# Patient Record
Sex: FEMALE | Race: WHITE | Employment: FULL TIME | URBAN - METROPOLITAN AREA
[De-identification: names, ages, dates, MRNs, and addresses within clinical notes are randomized per-mention and may not be internally consistent; named-entity substitution may affect disease eponyms.]

---

## 2024-02-22 ENCOUNTER — OFFICE VISIT (OUTPATIENT)
Dept: FAMILY MEDICINE CLINIC | Facility: CLINIC | Age: 49
End: 2024-02-22
Payer: COMMERCIAL

## 2024-02-22 VITALS
HEART RATE: 77 BPM | HEIGHT: 64.5 IN | OXYGEN SATURATION: 97 % | DIASTOLIC BLOOD PRESSURE: 84 MMHG | RESPIRATION RATE: 16 BRPM | WEIGHT: 268 LBS | SYSTOLIC BLOOD PRESSURE: 132 MMHG | BODY MASS INDEX: 45.2 KG/M2

## 2024-02-22 DIAGNOSIS — Z12.31 ENCOUNTER FOR SCREENING MAMMOGRAM FOR BREAST CANCER: ICD-10-CM

## 2024-02-22 DIAGNOSIS — K21.9 GASTROESOPHAGEAL REFLUX DISEASE, UNSPECIFIED WHETHER ESOPHAGITIS PRESENT: Primary | ICD-10-CM

## 2024-02-22 DIAGNOSIS — Z00.00 LABORATORY EXAMINATION ORDERED AS PART OF A ROUTINE GENERAL MEDICAL EXAMINATION: ICD-10-CM

## 2024-02-22 DIAGNOSIS — Z80.9 FAMILY HISTORY OF CANCER: ICD-10-CM

## 2024-02-22 DIAGNOSIS — K44.9 HIATAL HERNIA: ICD-10-CM

## 2024-02-22 DIAGNOSIS — G47.33 OSA (OBSTRUCTIVE SLEEP APNEA): ICD-10-CM

## 2024-02-22 DIAGNOSIS — Z12.4 ENCOUNTER FOR SCREENING FOR CERVICAL CANCER: ICD-10-CM

## 2024-02-22 DIAGNOSIS — Z12.11 COLON CANCER SCREENING: ICD-10-CM

## 2024-02-22 DIAGNOSIS — R06.09 DYSPNEA ON EXERTION: ICD-10-CM

## 2024-02-22 DIAGNOSIS — Z86.010 HISTORY OF COLON POLYPS: ICD-10-CM

## 2024-02-22 PROBLEM — Z86.0100 HISTORY OF COLON POLYPS: Status: ACTIVE | Noted: 2024-02-22

## 2024-02-22 RX ORDER — ALBUTEROL SULFATE 90 UG/1
2 AEROSOL, METERED RESPIRATORY (INHALATION) EVERY 6 HOURS PRN
Qty: 1 EACH | Refills: 2 | Status: SHIPPED | OUTPATIENT
Start: 2024-02-22

## 2024-02-22 RX ORDER — PANTOPRAZOLE SODIUM 40 MG/1
40 TABLET, DELAYED RELEASE ORAL
Qty: 90 TABLET | Refills: 1 | Status: SHIPPED | OUTPATIENT
Start: 2024-02-22

## 2024-02-22 RX ORDER — NICOTINE POLACRILEX 4 MG/1
20 GUM, CHEWING ORAL DAILY
COMMUNITY
End: 2024-02-22

## 2024-02-22 NOTE — PROGRESS NOTES
[facility] Family Medicine Office Note  Chief Complaint:   Chief Complaint   Patient presents with    [facility] Care    GI Problem     C/o worsening  dysphagia / acid reflex  x 6 months . Pt has Hx of hiatal hernia. Last C-scope was 2019 showing Polyps. Pt has been taking OTC Omeprazole with minimal help     Wheezing     C/o on and off wheezing after a work out / increasing heart rate. Pt reports shallow breathing , SOB that last x  1 hr after done with work out Pt notes possible seasonal allergies with weather changing.        HPI:   This is a 48 year old female coming in to [facility]. She has a history of GERD, hiatal hernia. Reports sx worsening in the past 6 months. Has taken OTC omeprazole w/ minimal improvement.     Reports 1 month history of worsening dyspnea with exercise. Recently started exercising w/ . Has been having dry cough. Unable to take a full deep breath during these times. Has also noticed wheezing at times, primarily at nighttime. No LE swelling. No orthopnea. Able to walk 2 blocks w/o any symptoms. No chest pains, chest tightness, palpitations. Denies any prior diagnosis of lung disease, asthma,   Remote smoking history, quit in . 1PPD x 9-10 years. No fevers, chills, unintentional wt changes.      Past Medical History:   Diagnosis Date    GERD (gastroesophageal reflux disease)     Hiatal hernia      Past Surgical History:   Procedure Laterality Date           Social History:  Social History     Socioeconomic History    Marital status: Single   Tobacco Use    Smoking status: Former     Types: Cigarettes     Quit date:      Years since quittin.1    Smokeless tobacco: Never   Vaping Use    Vaping Use: Never used   Substance and Sexual Activity    Alcohol use: Yes     Comment: socially    Drug use: Never     Family History:  Family History   Problem Relation Age of Onset    Heart Disease Mother     Hypertension Mother     Pancreatic  Cancer Father      Allergies:  No Known Allergies  Current Meds:  Current Outpatient Medications   Medication Sig Dispense Refill    Omeprazole 20 MG Oral Tab EC Take 20 mg by mouth daily.        Counseling given: Not Answered       REVIEW OF SYSTEMS:   Review of Systems   A comprehensive 10 point review of systems was completed.  Pertinent positives and negatives noted in the the HPI.    EXAM:   /84   Pulse 77   Resp 16   Ht 5' 4.5\" (1.638 m)   Wt 268 lb (121.6 kg)   SpO2 97%   BMI 45.29 kg/m²  Estimated body mass index is 45.29 kg/m² as calculated from the following:    Height as of this encounter: 5' 4.5\" (1.638 m).    Weight as of this encounter: 268 lb (121.6 kg).   Vital signs reviewed.Appears stated age, well groomed.  Physical Exam  Vitals and nursing note reviewed.   Constitutional:       Appearance: Normal appearance. She is obese.   HENT:      Head: Normocephalic and atraumatic.      Mouth/Throat:      Mouth: Mucous membranes are moist.      Pharynx: Oropharynx is clear.   Eyes:      Pupils: Pupils are equal, round, and reactive to light.   Cardiovascular:      Rate and Rhythm: Normal rate and regular rhythm.      Pulses: Normal pulses.      Heart sounds: Normal heart sounds. No murmur heard.  Pulmonary:      Effort: Pulmonary effort is normal. No respiratory distress.      Breath sounds: Normal breath sounds. No wheezing.   Musculoskeletal:      Right lower leg: No edema.      Left lower leg: No edema.   Skin:     Findings: No rash.   Neurological:      Mental Status: She is alert and oriented to person, place, and time.   Psychiatric:         Mood and Affect: Mood normal.          ASSESSMENT AND PLAN:   1. Gastroesophageal reflux disease, unspecified whether esophagitis present  Uncontrolled, suspect 2/2 hiatal hernia. We do not have records on this, will refer to surgery for evaluation. Will start protonix.  Reviewed dietary modification - advised on limiting foods producing reflux sx.    -  pantoprazole 40 MG Oral Tab EC; Take 1 tablet (40 mg total) by mouth every morning before breakfast.  Dispense: 90 tablet; Refill: 1    2. Hiatal hernia  See above   - Surgery Referral - In Network    3. Dyspnea on exertion  Concern for reactive airway disease vs deconditioning. Lower suspicion of cardiac etiology of sx. Will obtain CXR. Consider PFTs, echo in future if needed. Trial albuterol. Reviewed medication administration, side effects. F/u 1mo.   - XR CHEST PA + LAT CHEST (CPT=71046); Future  - albuterol 108 (90 Base) MCG/ACT Inhalation Aero Soln; Inhale 2 puffs into the lungs every 6 (six) hours as needed for Wheezing or Shortness of Breath.  Dispense: 1 each; Refill: 2    4. Laboratory examination ordered as part of a routine general medical examination  - CBC With Differential With Platelet; Future  - Comp Metabolic Panel (14); Future  - Lipid Panel; Future  - TSH W Reflex To Free T4; Future    5. Encounter for screening mammogram for breast cancer  - [facility] ARA 2D+3D SCREENING BILAT (CPT=77067/12635); Future    6. Encounter for screening for cervical cancer  - OBG Referral - [facility] ([place])    7. Colon cancer screening  - Surgery Referral - In Network    8. History of colon polyps  Due for CRC screening.     9. Family history of cancer  - Genetic Counselor Referral - [facility] ([place])    10. GIDEON (obstructive sleep apnea)  H/o GIDEON, diagnosed in the past but never completed CPAP titration study nor started any subsequent treatment as she did not want to wear a mask. Reviewed risks of uncontrolled GIDEON and complications. She will think about it and let us know if she proceeds for another sleep study.       Meds & Refills for this Visit:  Requested Prescriptions     Pending Prescriptions Disp Refills    pantoprazole 40 MG Oral Tab EC 90 tablet 1     Sig: Take 1 tablet (40 mg total) by mouth every morning before breakfast.       Health Maintenance:  Health Maintenance Due   Topic Date Due    Annual  Physical  Never done    Colorectal Cancer Screening  Never done    Mammogram  Never done    DTaP,Tdap,and Td Vaccines (1 - Tdap) Never done    Pap Smear  Never done    Zoster Vaccines (1 of 2) Never done    COVID-19 Vaccine (1 - 2023-24 season) Never done    Influenza Vaccine (1) Never done    Annual Depression Screening  Never done       Patient/Caregiver Education: Patient/Caregiver Education: There are no barriers to learning. Medical education done.   Outcome: Patient verbalizes understanding. Patient is notified to call with any questions, complications, allergies, or worsening or changing symptoms.  Patient is to call with any side effects or complications from the treatments as a result of today.     Problem List:  There is no problem list on file for this patient.

## 2024-02-23 ENCOUNTER — LAB ENCOUNTER (OUTPATIENT)
Dept: LAB | Age: 49
End: 2024-02-23
Attending: FAMILY MEDICINE
Payer: COMMERCIAL

## 2024-02-23 ENCOUNTER — HOSPITAL ENCOUNTER (OUTPATIENT)
Dept: GENERAL RADIOLOGY | Age: 49
Discharge: HOME | End: 2024-02-23
Attending: FAMILY MEDICINE
Payer: COMMERCIAL

## 2024-02-23 DIAGNOSIS — Z00.00 LABORATORY EXAMINATION ORDERED AS PART OF A ROUTINE GENERAL MEDICAL EXAMINATION: ICD-10-CM

## 2024-02-23 DIAGNOSIS — R06.09 DYSPNEA ON EXERTION: ICD-10-CM

## 2024-02-23 LAB
ALBUMIN SERPL-MCNC: 3.6 G/DL (ref 3.4–5)
ALBUMIN/GLOB SERPL: 1.1 {RATIO} (ref 1–2)
ALP LIVER SERPL-CCNC: 63 U/L
ALT SERPL-CCNC: 24 U/L
ANION GAP SERPL CALC-SCNC: 2 MMOL/L (ref 0–18)
AST SERPL-CCNC: 18 U/L (ref 15–37)
BASOPHILS # BLD AUTO: 0.02 X10(3) UL (ref 0–0.2)
BASOPHILS NFR BLD AUTO: 0.3 %
BILIRUB SERPL-MCNC: 0.4 MG/DL (ref 0.1–2)
BUN BLD-MCNC: 15 MG/DL (ref 9–23)
CALCIUM BLD-MCNC: 9.2 MG/DL (ref 8.5–10.1)
CHLORIDE SERPL-SCNC: 108 MMOL/L (ref 98–112)
CHOLEST SERPL-MCNC: 214 MG/DL (ref ?–200)
CO2 SERPL-SCNC: 28 MMOL/L (ref 21–32)
CREAT BLD-MCNC: 0.74 MG/DL
EGFRCR SERPLBLD CKD-EPI 2021: 99 ML/MIN/1.73M2 (ref 60–?)
EOSINOPHIL # BLD AUTO: 0.24 X10(3) UL (ref 0–0.7)
EOSINOPHIL NFR BLD AUTO: 3.8 %
ERYTHROCYTE [DISTWIDTH] IN BLOOD BY AUTOMATED COUNT: 13.6 %
FASTING PATIENT LIPID ANSWER: YES
FASTING STATUS PATIENT QL REPORTED: YES
GLOBULIN PLAS-MCNC: 3.2 G/DL (ref 2.8–4.4)
GLUCOSE BLD-MCNC: 101 MG/DL (ref 70–99)
HCT VFR BLD AUTO: 42.9 %
HDLC SERPL-MCNC: 49 MG/DL (ref 40–59)
HGB BLD-MCNC: 13.8 G/DL
IMM GRANULOCYTES # BLD AUTO: 0.04 X10(3) UL (ref 0–1)
IMM GRANULOCYTES NFR BLD: 0.6 %
LDLC SERPL CALC-MCNC: 137 MG/DL (ref ?–100)
LYMPHOCYTES # BLD AUTO: 1.81 X10(3) UL (ref 1–4)
LYMPHOCYTES NFR BLD AUTO: 28.8 %
MCH RBC QN AUTO: 30.3 PG (ref 26–34)
MCHC RBC AUTO-ENTMCNC: 32.2 G/DL (ref 31–37)
MCV RBC AUTO: 94.1 FL
MONOCYTES # BLD AUTO: 0.37 X10(3) UL (ref 0.1–1)
MONOCYTES NFR BLD AUTO: 5.9 %
NEUTROPHILS # BLD AUTO: 3.8 X10 (3) UL (ref 1.5–7.7)
NEUTROPHILS # BLD AUTO: 3.8 X10(3) UL (ref 1.5–7.7)
NEUTROPHILS NFR BLD AUTO: 60.6 %
NONHDLC SERPL-MCNC: 165 MG/DL (ref ?–130)
OSMOLALITY SERPL CALC.SUM OF ELEC: 287 MOSM/KG (ref 275–295)
PLATELET # BLD AUTO: 251 10(3)UL (ref 150–450)
POTASSIUM SERPL-SCNC: 4.3 MMOL/L (ref 3.5–5.1)
PROT SERPL-MCNC: 6.8 G/DL (ref 6.4–8.2)
RBC # BLD AUTO: 4.56 X10(6)UL
SODIUM SERPL-SCNC: 138 MMOL/L (ref 136–145)
T4 FREE SERPL-MCNC: 0.8 NG/DL (ref 0.8–1.7)
TRIGL SERPL-MCNC: 158 MG/DL (ref 30–149)
TSI SER-ACNC: 5.7 MIU/ML (ref 0.36–3.74)
VLDLC SERPL CALC-MCNC: 29 MG/DL (ref 0–30)
WBC # BLD AUTO: 6.3 X10(3) UL (ref 4–11)

## 2024-02-23 PROCEDURE — 85025 COMPLETE CBC W/AUTO DIFF WBC: CPT

## 2024-02-23 PROCEDURE — 36415 COLL VENOUS BLD VENIPUNCTURE: CPT

## 2024-02-23 PROCEDURE — 71046 X-RAY EXAM CHEST 2 VIEWS: CPT | Performed by: FAMILY MEDICINE

## 2024-02-23 PROCEDURE — 80061 LIPID PANEL: CPT

## 2024-02-23 PROCEDURE — 80053 COMPREHEN METABOLIC PANEL: CPT

## 2024-02-23 PROCEDURE — 84439 ASSAY OF FREE THYROXINE: CPT

## 2024-02-23 PROCEDURE — 84443 ASSAY THYROID STIM HORMONE: CPT

## 2024-02-29 DIAGNOSIS — R79.89 ELEVATED TSH: Primary | ICD-10-CM

## 2024-03-19 ENCOUNTER — LAB ENCOUNTER (OUTPATIENT)
Dept: LAB | Age: 49
End: 2024-03-19
Attending: FAMILY MEDICINE
Payer: COMMERCIAL

## 2024-03-19 ENCOUNTER — OFFICE VISIT (OUTPATIENT)
Dept: FAMILY MEDICINE CLINIC | Facility: CLINIC | Age: 49
End: 2024-03-19
Payer: COMMERCIAL

## 2024-03-19 VITALS
WEIGHT: 270 LBS | SYSTOLIC BLOOD PRESSURE: 124 MMHG | DIASTOLIC BLOOD PRESSURE: 82 MMHG | HEIGHT: 64.5 IN | OXYGEN SATURATION: 98 % | HEART RATE: 81 BPM | BODY MASS INDEX: 45.53 KG/M2 | RESPIRATION RATE: 18 BRPM

## 2024-03-19 DIAGNOSIS — E78.2 MIXED HYPERLIPIDEMIA: ICD-10-CM

## 2024-03-19 DIAGNOSIS — E66.01 CLASS 3 SEVERE OBESITY DUE TO EXCESS CALORIES WITH SERIOUS COMORBIDITY AND BODY MASS INDEX (BMI) OF 45.0 TO 49.9 IN ADULT (HCC): ICD-10-CM

## 2024-03-19 DIAGNOSIS — K21.9 GASTROESOPHAGEAL REFLUX DISEASE, UNSPECIFIED WHETHER ESOPHAGITIS PRESENT: ICD-10-CM

## 2024-03-19 DIAGNOSIS — R79.89 ELEVATED TSH: ICD-10-CM

## 2024-03-19 DIAGNOSIS — Z23 NEED FOR SHINGLES VACCINE: ICD-10-CM

## 2024-03-19 DIAGNOSIS — M54.42 ACUTE LEFT-SIDED LOW BACK PAIN WITH LEFT-SIDED SCIATICA: Primary | ICD-10-CM

## 2024-03-19 DIAGNOSIS — R06.09 DYSPNEA ON EXERTION: ICD-10-CM

## 2024-03-19 LAB — TSI SER-ACNC: 3.89 MIU/ML (ref 0.36–3.74)

## 2024-03-19 PROCEDURE — 3074F SYST BP LT 130 MM HG: CPT | Performed by: FAMILY MEDICINE

## 2024-03-19 PROCEDURE — 84439 ASSAY OF FREE THYROXINE: CPT

## 2024-03-19 PROCEDURE — 84443 ASSAY THYROID STIM HORMONE: CPT

## 2024-03-19 PROCEDURE — 36415 COLL VENOUS BLD VENIPUNCTURE: CPT

## 2024-03-19 PROCEDURE — 3008F BODY MASS INDEX DOCD: CPT | Performed by: FAMILY MEDICINE

## 2024-03-19 PROCEDURE — 90471 IMMUNIZATION ADMIN: CPT | Performed by: FAMILY MEDICINE

## 2024-03-19 PROCEDURE — 99214 OFFICE O/P EST MOD 30 MIN: CPT | Performed by: FAMILY MEDICINE

## 2024-03-19 PROCEDURE — 90750 HZV VACC RECOMBINANT IM: CPT | Performed by: FAMILY MEDICINE

## 2024-03-19 PROCEDURE — 3079F DIAST BP 80-89 MM HG: CPT | Performed by: FAMILY MEDICINE

## 2024-03-19 RX ORDER — CYCLOBENZAPRINE HCL 10 MG
10 TABLET ORAL NIGHTLY PRN
Qty: 30 TABLET | Refills: 1 | Status: SHIPPED | OUTPATIENT
Start: 2024-03-19

## 2024-03-19 RX ORDER — DICLOFENAC SODIUM 75 MG/1
75 TABLET, DELAYED RELEASE ORAL 2 TIMES DAILY
Qty: 60 TABLET | Refills: 1 | Status: SHIPPED | OUTPATIENT
Start: 2024-03-19

## 2024-03-19 NOTE — PROGRESS NOTES
[facility] Family Medicine Office Note  Chief Complaint:   Chief Complaint   Patient presents with    Lab Results     Will complete re-peat TSH today     Gastro-esophageal Reflux     Improved since started taking Protonix.     Back Pain     C/o low back pain x 1 month , pt notes hurt back initially after getting dress located only to RT side but now after working out with  pain has traveled to LT side. Pt has taken Advil , heating patch , stretching with no help     Shortness Of Breath     Resolved with the use of inhaler.        HPI:   This is a 49 year old female coming in for    Low back pain - ongoing for the past 1 month. Occurred after bending over to  an object. Localized to R low back. Reports 8/10 at that time. She was following with  and she returned back after 1 week. It moved to L side now. Does radiate down L leg, reports paraesthesias. No saddle anesthesia. No bowel or bladder incontinence.     GERD - improved w/ pantoprazole, dietary modification.     Dyspnea - improved w/ albuterol. Has been using w/ exercise.     Past Medical History:   Diagnosis Date    GERD (gastroesophageal reflux disease)     Hiatal hernia     Obesity      Past Surgical History:   Procedure Laterality Date           Social History:  Social History     Socioeconomic History    Marital status: Single   Tobacco Use    Smoking status: Former     Years: 10     Types: Cigarettes     Quit date: 10/22/1999     Years since quittin.4    Smokeless tobacco: Never   Vaping Use    Vaping Use: Never used   Substance and Sexual Activity    Alcohol use: Yes     Alcohol/week: 3.0 standard drinks of alcohol     Types: 3 Cans of beer per week     Comment: socially    Drug use: Never   Other Topics Concern    Caffeine Concern No    Exercise No    Seat Belt No    Special Diet No    Stress Concern No    Weight Concern No     Family History:  Family History   Problem Relation Age of Onset     Heart Disease Mother     Hypertension Mother     Pancreatic Cancer Father      Allergies:  No Known Allergies  Current Meds:  Current Outpatient Medications   Medication Sig Dispense Refill    cyclobenzaprine 10 MG Oral Tab Take 1 tablet (10 mg total) by mouth nightly as needed for Muscle spasms. 30 tablet 1    diclofenac 75 MG Oral Tab EC Take 1 tablet (75 mg total) by mouth 2 (two) times daily. 60 tablet 1    pantoprazole 40 MG Oral Tab EC Take 1 tablet (40 mg total) by mouth every morning before breakfast. 90 tablet 1    albuterol 108 (90 Base) MCG/ACT Inhalation Aero Soln Inhale 2 puffs into the lungs every 6 (six) hours as needed for Wheezing or Shortness of Breath. 1 each 2      Counseling given: Not Answered       REVIEW OF SYSTEMS:   Review of Systems   Constitutional: Negative.    HENT: Negative.     Eyes: Negative.    Respiratory: Negative.     Cardiovascular: Negative.    Gastrointestinal: Negative.    Endocrine: Negative.    Genitourinary: Negative.    Musculoskeletal:  Positive for arthralgias, back pain and gait problem.   Skin: Negative.    Psychiatric/Behavioral: Negative.          EXAM:   /82   Pulse 81   Resp 18   Ht 5' 4.5\" (1.638 m)   Wt 270 lb (122.5 kg)   LMP 03/12/2024 (Exact Date)   SpO2 98%   BMI 45.63 kg/m²  Estimated body mass index is 45.63 kg/m² as calculated from the following:    Height as of this encounter: 5' 4.5\" (1.638 m).    Weight as of this encounter: 270 lb (122.5 kg).   Vital signs reviewed.Appears stated age, well groomed.  Physical Exam  Vitals and nursing note reviewed.   Constitutional:       Appearance: Normal appearance.   HENT:      Head: Normocephalic and atraumatic.   Pulmonary:      Effort: Pulmonary effort is normal.   Musculoskeletal:      Cervical back: Normal.      Thoracic back: Normal.      Lumbar back: Tenderness present. No bony tenderness. Decreased range of motion. Negative right straight leg raise test and negative left straight leg raise  test.   Skin:     Findings: No rash.   Neurological:      Mental Status: She is alert and oriented to person, place, and time.   Psychiatric:         Mood and Affect: Mood normal.        ASSESSMENT AND PLAN:   1. Acute left-sided low back pain with sciatica  Suspect lumbar radiculopathy. No red flag symptoms. Start conservative treatments - alternate heat/ice, start tylenol and/or nsaids, topical pain medications or topical pain patch as needed.   -Start:  flexeril, diclofenac.   -Imaging: none needed at this time.   -Therapy: PT referral provided.    -Reviewed return and ED precautions.   -Follow up in 1mo    - cyclobenzaprine 10 MG Oral Tab; Take 1 tablet (10 mg total) by mouth nightly as needed for Muscle spasms.  Dispense: 30 tablet; Refill: 1  - diclofenac 75 MG Oral Tab EC; Take 1 tablet (75 mg total) by mouth 2 (two) times daily.  Dispense: 60 tablet; Refill: 1  - Physical Therapy Referral - [facility]    2. Gastroesophageal reflux disease, unspecified whether esophagitis present  Improved - CPM.     3. Dyspnea on exertion  Likely reactive airway disease/asthma.   Continue albuterol. Consider PFTs in future.   Reviewed CXR w/ patient.     4. Need for shingles vaccine  - ZOSTER VACC RECOMBINANT IM NJX    5. Mixed hyperlipidemia  Continue low fat diet, regular exercise, 10yr ASCVD risk score 1.6%.    6. Class 3 severe obesity due to excess calories with serious comorbidity and body mass index (BMI) of 45.0 to 49.9 in adult (HCC)  Desires surgical bariatrics referral, will place.   - SURGICAL BARIATRICS - INTERNAL      Meds & Refills for this Visit:  Requested Prescriptions     Signed Prescriptions Disp Refills    cyclobenzaprine 10 MG Oral Tab 30 tablet 1     Sig: Take 1 tablet (10 mg total) by mouth nightly as needed for Muscle spasms.    diclofenac 75 MG Oral Tab EC 60 tablet 1     Sig: Take 1 tablet (75 mg total) by mouth 2 (two) times daily.       Health Maintenance:  Health Maintenance Due   Topic  Date Due    Annual Physical  Never done    Colorectal Cancer Screening  Never done    Mammogram  Never done    DTaP,Tdap,and Td Vaccines (1 - Tdap) Never done    Pap Smear  Never done    Zoster Vaccines (1 of 2) Never done    COVID-19 Vaccine (1 - 2023-24 season) Never done    Influenza Vaccine (1) Never done    Annual Depression Screening  Never done       Patient/Caregiver Education: Patient/Caregiver Education: There are no barriers to learning. Medical education done.   Outcome: Patient verbalizes understanding. Patient is notified to call with any questions, complications, allergies, or worsening or changing symptoms.  Patient is to call with any side effects or complications from the treatments as a result of today.     Problem List:  Patient Active Problem List   Diagnosis    History of colon polyps    Hiatal hernia    Gastroesophageal reflux disease

## 2024-03-20 LAB — T4 FREE SERPL-MCNC: 0.9 NG/DL (ref 0.8–1.7)

## 2024-04-13 ENCOUNTER — LAB ENCOUNTER (OUTPATIENT)
Dept: LAB | Age: 49
End: 2024-04-13
Attending: FAMILY MEDICINE
Payer: COMMERCIAL

## 2024-04-13 ENCOUNTER — HOSPITAL ENCOUNTER (OUTPATIENT)
Dept: MAMMOGRAPHY | Age: 49
Discharge: HOME | End: 2024-04-13
Attending: FAMILY MEDICINE
Payer: COMMERCIAL

## 2024-04-13 DIAGNOSIS — R79.89 ELEVATED TSH: ICD-10-CM

## 2024-04-13 DIAGNOSIS — Z12.31 ENCOUNTER FOR SCREENING MAMMOGRAM FOR BREAST CANCER: ICD-10-CM

## 2024-04-13 LAB
THYROGLOB SERPL-MCNC: 46 U/ML (ref ?–60)
THYROPEROXIDASE AB SERPL-ACNC: 987 U/ML (ref ?–60)

## 2024-04-13 PROCEDURE — 86376 MICROSOMAL ANTIBODY EACH: CPT

## 2024-04-13 PROCEDURE — 86800 THYROGLOBULIN ANTIBODY: CPT

## 2024-04-13 PROCEDURE — 36415 COLL VENOUS BLD VENIPUNCTURE: CPT

## 2024-04-13 PROCEDURE — 77067 SCR MAMMO BI INCL CAD: CPT | Performed by: FAMILY MEDICINE

## 2024-04-13 PROCEDURE — 77063 BREAST TOMOSYNTHESIS BI: CPT | Performed by: FAMILY MEDICINE

## 2024-04-16 ENCOUNTER — TELEPHONE (OUTPATIENT)
Dept: FAMILY MEDICINE CLINIC | Facility: CLINIC | Age: 49
End: 2024-04-16

## 2024-04-16 DIAGNOSIS — E06.3 AUTOIMMUNE HYPOTHYROIDISM: Primary | ICD-10-CM

## 2024-04-16 RX ORDER — LEVOTHYROXINE SODIUM 0.03 MG/1
25 TABLET ORAL
Qty: 30 TABLET | Refills: 2 | Status: SHIPPED | OUTPATIENT
Start: 2024-04-16

## 2024-04-16 NOTE — TELEPHONE ENCOUNTER
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 Would recommend to start levothyroxine (thyroid hormone supplement) to get levels within normal range. Would recommend to start levothyroxine 25mcg daily (take in morning by itself on empty stomach 30 minutes before any other medications/breakfast etc.) and repeat TSH in 8wks. We can discuss this more at her next appointment. Can aid in weight loss. Can help if having any fatigue, temperature insensitivity, constipation, dry skin etc.

## 2024-04-30 ENCOUNTER — PATIENT MESSAGE (OUTPATIENT)
Dept: FAMILY MEDICINE CLINIC | Facility: CLINIC | Age: 49
End: 2024-04-30

## 2024-04-30 DIAGNOSIS — E66.01 CLASS 3 SEVERE OBESITY DUE TO EXCESS CALORIES WITH SERIOUS COMORBIDITY AND BODY MASS INDEX (BMI) OF 45.0 TO 49.9 IN ADULT (HCC): Primary | ICD-10-CM

## 2024-05-01 NOTE — TELEPHONE ENCOUNTER
From: Steven Hinton  To: Don Grimes  Sent: 4/30/2024 8:59 PM CDT  Subject: Bariatric Surgery Authorization     Hello,  I am in the process of setting up an appointment with Dr. Johnson at [facility]. They are advising me that I need a referral from doctor Cornelio and it needs to be authorized by him. Can you let me know if this has been sent?  Thank you.  Steven Hinton

## 2024-07-03 ENCOUNTER — OFFICE VISIT (OUTPATIENT)
Dept: FAMILY MEDICINE CLINIC | Facility: CLINIC | Age: 49
End: 2024-07-03
Payer: COMMERCIAL

## 2024-07-03 ENCOUNTER — LAB ENCOUNTER (OUTPATIENT)
Dept: LAB | Age: 49
End: 2024-07-03
Attending: FAMILY MEDICINE
Payer: COMMERCIAL

## 2024-07-03 VITALS
SYSTOLIC BLOOD PRESSURE: 124 MMHG | HEART RATE: 76 BPM | DIASTOLIC BLOOD PRESSURE: 84 MMHG | WEIGHT: 282 LBS | OXYGEN SATURATION: 97 % | HEIGHT: 65 IN | BODY MASS INDEX: 46.98 KG/M2

## 2024-07-03 DIAGNOSIS — M54.42 ACUTE LEFT-SIDED LOW BACK PAIN WITH LEFT-SIDED SCIATICA: ICD-10-CM

## 2024-07-03 DIAGNOSIS — Z23 NEED FOR SHINGLES VACCINE: ICD-10-CM

## 2024-07-03 DIAGNOSIS — E03.9 HYPOTHYROIDISM, UNSPECIFIED TYPE: ICD-10-CM

## 2024-07-03 DIAGNOSIS — E03.9 HYPOTHYROIDISM, UNSPECIFIED TYPE: Primary | ICD-10-CM

## 2024-07-03 LAB — TSI SER-ACNC: 3.15 MIU/ML (ref 0.36–3.74)

## 2024-07-03 PROCEDURE — 36415 COLL VENOUS BLD VENIPUNCTURE: CPT

## 2024-07-03 PROCEDURE — 3008F BODY MASS INDEX DOCD: CPT | Performed by: FAMILY MEDICINE

## 2024-07-03 PROCEDURE — 90750 HZV VACC RECOMBINANT IM: CPT | Performed by: FAMILY MEDICINE

## 2024-07-03 PROCEDURE — 3074F SYST BP LT 130 MM HG: CPT | Performed by: FAMILY MEDICINE

## 2024-07-03 PROCEDURE — 3079F DIAST BP 80-89 MM HG: CPT | Performed by: FAMILY MEDICINE

## 2024-07-03 PROCEDURE — 84443 ASSAY THYROID STIM HORMONE: CPT

## 2024-07-03 PROCEDURE — 99213 OFFICE O/P EST LOW 20 MIN: CPT | Performed by: FAMILY MEDICINE

## 2024-07-03 PROCEDURE — 90471 IMMUNIZATION ADMIN: CPT | Performed by: FAMILY MEDICINE

## 2024-07-03 NOTE — PROGRESS NOTES
[facility] Family Medicine Office Note  Chief Complaint:   Chief Complaint   Patient presents with    Follow - Up     Follow up on thyroid medication        HPI:   This is a 49 year old female coming in for     Subclinical hypothyroidism - antibody testing, anti TPO 900s. Started on synthroid 25mcg at last visit. Denies any SE, tolerating medication well. Denies any symptoms at this time.     LBP - improving w/ conservative mgmt. No new symptoms. No red flag sx.     Past Medical History:    GERD (gastroesophageal reflux disease)    Hiatal hernia    Obesity     Past Surgical History:   Procedure Laterality Date           Social History:  Social History     Socioeconomic History    Marital status: Single   Tobacco Use    Smoking status: Former     Current packs/day: 0.00     Types: Cigarettes     Start date: 10/22/1989     Quit date: 10/22/1999     Years since quittin.7    Smokeless tobacco: Never   Vaping Use    Vaping status: Never Used   Substance and Sexual Activity    Alcohol use: Yes     Alcohol/week: 3.0 standard drinks of alcohol     Types: 3 Cans of beer per week     Comment: socially    Drug use: Never   Other Topics Concern    Caffeine Concern No    Exercise No    Seat Belt No    Special Diet No    Stress Concern No    Weight Concern No     Family History:  Family History   Problem Relation Age of Onset    Heart Disease Mother     Hypertension Mother     Pancreatic Cancer Father      Allergies:  No Known Allergies  Current Meds:  Current Outpatient Medications   Medication Sig Dispense Refill    levothyroxine 25 MCG Oral Tab Take 1 tablet (25 mcg total) by mouth before breakfast. 30 tablet 2    cyclobenzaprine 10 MG Oral Tab Take 1 tablet (10 mg total) by mouth nightly as needed for Muscle spasms. 30 tablet 1    diclofenac 75 MG Oral Tab EC Take 1 tablet (75 mg total) by mouth 2 (two) times daily. 60 tablet 1    pantoprazole 40 MG Oral Tab EC Take 1 tablet (40 mg total) by mouth  every morning before breakfast. 90 tablet 1    albuterol 108 (90 Base) MCG/ACT Inhalation Aero Soln Inhale 2 puffs into the lungs every 6 (six) hours as needed for Wheezing or Shortness of Breath. 1 each 2      Counseling given: Not Answered       REVIEW OF SYSTEMS:   Review of Systems   Constitutional: Negative.    HENT: Negative.     Eyes: Negative.    Respiratory: Negative.     Cardiovascular: Negative.    Gastrointestinal: Negative.    Endocrine: Negative.    Genitourinary: Negative.    Musculoskeletal: Negative.    Skin: Negative.    Neurological: Negative.    Psychiatric/Behavioral: Negative.          EXAM:   /84   Pulse 76   Ht 5' 5\" (1.651 m)   Wt 282 lb (127.9 kg)   LMP 06/22/2024 (Exact Date)   SpO2 97%   BMI 46.93 kg/m²  Estimated body mass index is 46.93 kg/m² as calculated from the following:    Height as of this encounter: 5' 5\" (1.651 m).    Weight as of this encounter: 282 lb (127.9 kg).   Vital signs reviewed.Appears stated age, well groomed.  Physical Exam  Vitals and nursing note reviewed.   Constitutional:       Appearance: Normal appearance.   HENT:      Head: Normocephalic and atraumatic.   Cardiovascular:      Rate and Rhythm: Normal rate and regular rhythm.      Pulses: Normal pulses.      Heart sounds: Normal heart sounds. No murmur heard.  Pulmonary:      Effort: Pulmonary effort is normal. No respiratory distress.      Breath sounds: Normal breath sounds. No stridor. No wheezing or rhonchi.   Neurological:      Mental Status: She is alert and oriented to person, place, and time.   Psychiatric:         Mood and Affect: Mood normal.          ASSESSMENT AND PLAN:   1. Hypothyroidism, unspecified type  Due for TSH recheck CPM for now.   - TSH W Reflex To Free T4; Future    2. Need for shingles vaccine  - ZOSTER VACC RECOMBINANT IM NJX    3. Acute left-sided low back pain with left-sided sciatica  Improving, CPM.       Meds & Refills for this Visit:  Requested Prescriptions      No  prescriptions requested or ordered in this encounter       Health Maintenance:  Health Maintenance Due   Topic Date Due    Annual Physical  Never done    Colorectal Cancer Screening  Never done    DTaP,Tdap,and Td Vaccines (1 - Tdap) Never done    Pap Smear  Never done    COVID-19 Vaccine (1 - 2023-24 season) Never done    Annual Depression Screening  Never done       Patient/Caregiver Education: Patient/Caregiver Education: There are no barriers to learning. Medical education done.   Outcome: Patient verbalizes understanding. Patient is notified to call with any questions, complications, allergies, or worsening or changing symptoms.  Patient is to call with any side effects or complications from the treatments as a result of today.     Problem List:  Patient Active Problem List   Diagnosis    History of colon polyps    Hiatal hernia    Gastroesophageal reflux disease

## 2024-07-09 RX ORDER — LEVOTHYROXINE SODIUM 0.03 MG/1
25 TABLET ORAL
Qty: 90 TABLET | Refills: 1 | Status: SHIPPED | OUTPATIENT
Start: 2024-07-09

## 2024-08-26 DIAGNOSIS — K21.9 GASTROESOPHAGEAL REFLUX DISEASE, UNSPECIFIED WHETHER ESOPHAGITIS PRESENT: ICD-10-CM

## 2024-08-26 RX ORDER — PANTOPRAZOLE SODIUM 40 MG/1
40 TABLET, DELAYED RELEASE ORAL
Qty: 90 TABLET | Refills: 1 | Status: SHIPPED | OUTPATIENT
Start: 2024-08-26

## 2024-09-24 ENCOUNTER — TELEMEDICINE (OUTPATIENT)
Dept: FAMILY MEDICINE CLINIC | Facility: CLINIC | Age: 49
End: 2024-09-24
Payer: COMMERCIAL

## 2024-09-24 DIAGNOSIS — N95.1 PERIMENOPAUSE: ICD-10-CM

## 2024-09-24 DIAGNOSIS — Z12.11 ENCOUNTER FOR SCREENING COLONOSCOPY: Primary | ICD-10-CM

## 2024-09-24 PROCEDURE — 99214 OFFICE O/P EST MOD 30 MIN: CPT | Performed by: NURSE PRACTITIONER

## 2024-09-24 NOTE — PROGRESS NOTES
Telehealth outside of [facility]  Telehealth Verbal Consent   I conducted a telehealth visit with Steven Hinton today, 09/24/24, which was completed using two-way, real-time interactive audio and video communication. This has been done in good monika to provide continuity of care in the best interest of the provider-patient relationship, due to the COVID - public health crisis/national emergency where restrictions of face-to-face office visits are ongoing. Every conscious effort was taken to allow for sufficient and adequate time to complete the visit.  The patient was made aware of the limitations of the telehealth visit, including treatment limitations as no physical exam could be performed.  The patient was advised to call 911 or to go to the ER in case there was an emergency.  The patient was also advised of the potential privacy & security concerns related to the telehealth platform.   The patient was made aware of where to find [facility]'s notice of privacy practices, telehealth consent form and other related consent forms and documents.  which are located on the [facility] website. The patient verbally agreed to telehealth consent form, related consents and the risks discussed.    Lastly, the patient confirmed that they were in [place].   Included in this visit, time may have been spent reviewing labs, medications, radiology tests and decision making. Appropriate medical decision-making and tests are ordered as detailed in the plan of care above.  Coding/billing information is submitted for this visit based on complexity of care and/or time spent for the visit.  Duration 5 minutes   [facility]   PROGRESS NOTE  Chief Complaint:       HPI:   This is a 49 year old female coming in for referrals     Results for orders placed or performed in visit on 07/03/24   TSH W Reflex To Free T4   Result Value Ref Range    TSH 3.150 0.358 - 3.740 mIU/mL       Past Medical History:    GERD (gastroesophageal reflux disease)     Hiatal hernia    Obesity     Past Surgical History:   Procedure Laterality Date           Social History:  Social History     Socioeconomic History    Marital status: Single   Tobacco Use    Smoking status: Former     Current packs/day: 0.00     Types: Cigarettes     Start date: 10/22/1989     Quit date: 10/22/1999     Years since quittin.9    Smokeless tobacco: Never   Vaping Use    Vaping status: Never Used   Substance and Sexual Activity    Alcohol use: Yes     Alcohol/week: 3.0 standard drinks of alcohol     Types: 3 Cans of beer per week     Comment: socially    Drug use: Never   Other Topics Concern    Caffeine Concern No    Exercise No    Seat Belt No    Special Diet No    Stress Concern No    Weight Concern No     Family History:  Family History   Problem Relation Age of Onset    Heart Disease Mother     Hypertension Mother     Pancreatic Cancer Father      Allergies:  No Known Allergies  Current Meds:  Current Outpatient Medications   Medication Sig Dispense Refill    PANTOPRAZOLE 40 MG Oral Tab EC TAKE 1 TABLET(40 MG) BY MOUTH EVERY MORNING BEFORE BREAKFAST 90 tablet 1    levothyroxine 25 MCG Oral Tab TAKE 1 TABLET(25 MCG) BY MOUTH BEFORE BREAKFAST 90 tablet 1    cyclobenzaprine 10 MG Oral Tab Take 1 tablet (10 mg total) by mouth nightly as needed for Muscle spasms. 30 tablet 1    diclofenac 75 MG Oral Tab EC Take 1 tablet (75 mg total) by mouth 2 (two) times daily. 60 tablet 1    albuterol 108 (90 Base) MCG/ACT Inhalation Aero Soln Inhale 2 puffs into the lungs every 6 (six) hours as needed for Wheezing or Shortness of Breath. 1 each 2      Counseling given: Not Answered       REVIEW OF SYSTEMS:   CONSTITUTIONAL:  Denies  fever, chills, or fatigue.  EENT:  Eyes:  Denies eye pain, visual loss, blurred vision, double vision or yellow sclerae. Ears, Nose, Throat:  Denies hearing loss, sneezing, congestion, runny nose or sore throat.  INTEGUMENTARY:  Denies rashes, itching, skin lesion, or  excessive skin dryness.  CARDIOVASCULAR:  Denies chest pain, chest pressure, chest discomfort, palpitations, edema, dyspnea on exertion or at rest.  RESPIRATORY:  Denies shortness of breath, wheezing, cough or sputum.  GASTROINTESTINAL:  Denies abdominal pain, nausea, vomiting, constipation, diarrhea, or blood in stool.  MUSCULOSKELETAL:  Denies weakness, muscle aches, back pain, joint pain, swelling or stiffness.  NEUROLOGICAL:  Denies headache, seizures, dizziness, syncope, paralysis, ataxia, numbness or tingling in the extremities,change in bowel or bladder control.  HEMATOLOGIC:  Denies anemia, bleeding or bruising.  LYMPHATICS:  Denies enlarged nodes or history of splenectomy.  PSYCHIATRIC:  Denies depression or anxiety.  ENDOCRINOLOGIC:  Denies excessive sweating, cold or heat intolerance, polyuria or polydipsia.  ALLERGIES:  Denies allergic response, history of asthma, sneezing, hives, eczema or rhinitis.     EXAM:   [facility] 06/22/2024 (Exact Date)  Estimated body mass index is 46.93 kg/m² as calculated from the following:    Height as of 7/3/24: 5' 5\" (1.651 m).    Weight as of 7/3/24: 282 lb (127.9 kg).   Vital signs reviewed.Appears stated age, well groomed.  Physical Exam:  GEN:  Patient is alert, awake and oriented, in  no apparent distress.      ASSESSMENT AND PLAN:   Diagnoses and all orders for this visit:    Encounter for screening colonoscopy  -     Refer to General Surgery    Perimenopause  -     [facility] Referral - In Network         Meds & Refills for this Visit:  Requested Prescriptions      No prescriptions requested or ordered in this encounter       Health Maintenance:  Health Maintenance Due   Topic Date Due    Annual Physical  Never done    Colorectal Cancer Screening  Never done    DTaP,Tdap,and Td Vaccines (1 - Tdap) Never done    Pap Smear  Never done    Annual Depression Screening  Never done    COVID-19 Vaccine (1 - 2023-24 season) Never done       Patient/Caregiver Education: Patient/Caregiver  Education: There are no barriers to learning. Medical education done.   Outcome: Patient verbalizes understanding. Patient is notified to call with any questions, complications, allergies, or worsening or changing symptoms.  Patient is to call with any side effects or complications from the treatments as a result of today.     Problem List:  Patient Active Problem List   Diagnosis    History of colon polyps    Hiatal hernia    Gastroesophageal reflux disease       ANTWAN Avila  9/24/2024  3:58 PM

## 2024-10-07 ENCOUNTER — OFFICE VISIT (OUTPATIENT)
Dept: INTERNAL MEDICINE CLINIC | Facility: CLINIC | Age: 49
End: 2024-10-07
Payer: COMMERCIAL

## 2024-10-07 VITALS
BODY MASS INDEX: 49.34 KG/M2 | SYSTOLIC BLOOD PRESSURE: 120 MMHG | HEIGHT: 64 IN | RESPIRATION RATE: 16 BRPM | DIASTOLIC BLOOD PRESSURE: 90 MMHG | HEART RATE: 86 BPM | WEIGHT: 289 LBS

## 2024-10-07 DIAGNOSIS — Z82.49 FAMILY HISTORY OF HEART DISEASE: ICD-10-CM

## 2024-10-07 DIAGNOSIS — E88.819 INSULIN RESISTANCE: ICD-10-CM

## 2024-10-07 DIAGNOSIS — E66.01 CLASS 3 SEVERE OBESITY WITH SERIOUS COMORBIDITY AND BODY MASS INDEX (BMI) OF 45.0 TO 49.9 IN ADULT, UNSPECIFIED OBESITY TYPE (HCC): ICD-10-CM

## 2024-10-07 DIAGNOSIS — G47.33 OSA (OBSTRUCTIVE SLEEP APNEA): ICD-10-CM

## 2024-10-07 DIAGNOSIS — E66.813 CLASS 3 SEVERE OBESITY WITH SERIOUS COMORBIDITY AND BODY MASS INDEX (BMI) OF 45.0 TO 49.9 IN ADULT, UNSPECIFIED OBESITY TYPE (HCC): ICD-10-CM

## 2024-10-07 DIAGNOSIS — Z51.81 ENCOUNTER FOR THERAPEUTIC DRUG MONITORING: Primary | ICD-10-CM

## 2024-10-07 DIAGNOSIS — K21.9 GASTROESOPHAGEAL REFLUX DISEASE, UNSPECIFIED WHETHER ESOPHAGITIS PRESENT: ICD-10-CM

## 2024-10-07 DIAGNOSIS — R73.01 IFG (IMPAIRED FASTING GLUCOSE): ICD-10-CM

## 2024-10-07 DIAGNOSIS — N95.1 PERIMENOPAUSE: ICD-10-CM

## 2024-10-07 RX ORDER — TIRZEPATIDE 2.5 MG/.5ML
2.5 INJECTION, SOLUTION SUBCUTANEOUS WEEKLY
Qty: 2 ML | Refills: 0 | Status: SHIPPED | OUTPATIENT
Start: 2024-10-07

## 2024-10-07 RX ORDER — TIRZEPATIDE 5 MG/.5ML
5 INJECTION, SOLUTION SUBCUTANEOUS WEEKLY
Qty: 2 ML | Refills: 3 | Status: SHIPPED | OUTPATIENT
Start: 2024-10-07

## 2024-10-07 NOTE — PROGRESS NOTES
HISTORY OF PRESENT ILLNESS  Chief Complaint   Patient presents with    Weight Problem     Dr Grimes referral. Phentermine with success in the past. No regular exercise       Steven Hinton is a 49 year old female new to our office today for initiation of medical weight loss program, referred by PCP.  Patient presents today with c/o excess weight since puberty.    Reason/goal for weight loss: improve health and increase exercise    Previous weight loss efforts in the past: see below.    Past 6 months lifestyle interventions: yes, regular exercise    Reviewed [facility] patient contract. Readiness for Lifestyle change: 10/10, Interest in Medication: 10/10, Bariatric surgery interest: 10/10.    Barriers to weight loss: late night eating/snacking    Wt Readings from Last 6 Encounters:   10/07/24 289 lb (131.1 kg)   07/03/24 282 lb (127.9 kg)   03/19/24 270 lb (122.5 kg)   02/22/24 268 lb (121.6 kg)   11/16/21 295 lb (133.8 kg)          Social hx and lifestyle reviewed:    How many meals do you eat out per week: not reported  Who is the primary cook in your home: shared with boyfriend    Please respond to the questions regarding your previous weight loss    How did you hear about the [facility]? Primary Care Provider   Previous weight loss efforts in the past/medication(s): Weight watchers, Atkins, low carb, phentermine   Eating behaviors/patterns that have been barriers to weight loss success in the past: Not sticking with a plan for long term. Get frustrated with gaining some weight back and then continue to self sabotage   Please respond to the questions regarding a 24 hour food journal.  Include the average time you ate and the quantity/food preparation method.    List foods, qty and prep for breakfast: 1 breakfast burrito, coffee with flavored creamer   List foods, qty and prep for lunch. No lunch   List foods, qty and prep for dinner. 1 bowl of beef stew, 2 buscuits with butter   List foods, qty and prep  for snacks. Chip cheese slices and crackers..qty unknown   List the types and qty of fluids consumed 2 cups coffee with flavored cream, 4-16 oz bottles of water   Please respond to the questions regarding lifestyle.    Tobacco use: No   Alcohol use: How many servings per week? 3   Supplements taken on a regular basis include: Multi- vitamin, potassium, probiotic   Please respond to the questions regarding exercise/activity    How many days per week are you active or exercise 1   What type of activities: Walking   Perceived level of exertion on a scale of 1-5, with 5 being very intense: 2   Average stress level on a scale of 1-10, with 10 being extremely stressed: 6   How do you cope with stress: Eating   Please respond to the questions regarding sleep    How many hours of uninterrupted sleep do you get a night: 7   How many times do you wake up in the night: 5   Do you feel rested in the morning: No   Do you snore: Yes   Do you have sleep apnea: Yes   Do you use: None     Work:  for [employer] with [employer] department  Marital status: In relationship with boyfriend who had bariatric surgery  Support: yes    MEDICAL HISTORY  PMH reviewed:   Cardiac disorders: negative  Depression/anxiety: negative  Glaucoma: negative  Kidney stones: negative  Eating disorder: negative  Migraines: negative  Seizures: negative  Joint-related conditions: negative  Liver disease: negative  Renal disease: negative  Diabetes: negative  Thyroid disease: hypothyroidism  Constipation: negative  Other pertinent hx: GERD- on PPI controlled  Sleep Apnea hx: GIDEON not on CPAP  Cancer hx: negative  Cholecystectomy and/or gallstones: negative  Family or personal history of Pancreatic issues / Medullary Thyroid Cancer/MENS 2: negative  History of bariatric surgery: negative    FMH reviewed: obesity in parent/s or sibling: grandparent    REVIEW OF SYSTEMS  GENERAL: feels well otherwise, +fatigue  SKIN: denies any rashes to skin  folds  HEENT: snoring- yes  LUNGS: denies shortness of breath with exertion  CARDIOVASCULAR: denies chest pain on exertion, denies palpitations or pedal edema  GI: denies abdominal pain.  No N/V/D/C  MUSCULOSKELETAL: denies joint pains  NEURO: denies headaches  PSYCH: denies change in behavior or mood, denies feeling sad or depressed. BED- negative.    EXAM  /90   Pulse 86   Resp 16   Ht 5' 4\" (1.626 m)   Wt 289 lb (131.1 kg)   LMP 09/21/2024 (Exact Date)   BMI 49.61 kg/m² ,   Not available  GENERAL: well developed, well nourished, in no apparent distress, morbidly obese  SKIN: warm, pink, dry without rashes to exposed area  EYES: conjunctiva pink, sclera non icteric, PERRLA  HEENT: atraumatic, normocephalic, O/p: Mallampati score- 3  NECK: supple, non tender, no adenopathy, no thyromegaly  LUNGS: CTA in all fields, breathing non labored  CARDIO: RRR without murmur, normal S1 and S2 without clicks or gallops, no pedal edema.   GI: +BS, soft, no masses, HSM or tenderness  MUSCULOSKELETAL: grossly intact  NEURO: Oriented times three  PSYCH: pleasant, cooperative, normal mood and affect    Lab Results   Component Value Date    WBC 6.3 02/23/2024    RBC 4.56 02/23/2024    HGB 13.8 02/23/2024    HCT 42.9 02/23/2024    MCV 94.1 02/23/2024    MCH 30.3 02/23/2024    MCHC 32.2 02/23/2024    RDW 13.6 02/23/2024    .0 02/23/2024     Lab Results   Component Value Date     (H) 02/23/2024    BUN 15 02/23/2024    CREATSERUM 0.74 02/23/2024    ANIONGAP 2 02/23/2024    CA 9.2 02/23/2024    OSMOCALC 287 02/23/2024    ALKPHO 63 02/23/2024    AST 18 02/23/2024    ALT 24 02/23/2024    BILT 0.4 02/23/2024    TP 6.8 02/23/2024    ALB 3.6 02/23/2024    GLOBULIN 3.2 02/23/2024     02/23/2024    K 4.3 02/23/2024     02/23/2024    CO2 28.0 02/23/2024     No results found for: \"EAG\", \"A1C\"  Lab Results   Component Value Date    CHOLEST 214 (H) 02/23/2024    TRIG 158 (H) 02/23/2024    HDL 49 02/23/2024      (H) 02/23/2024    VLDL 29 02/23/2024    NONHDLC 165 (H) 02/23/2024     Lab Results   Component Value Date    T4F 0.9 03/19/2024    TSH 3.150 07/03/2024     No results found for: \"B12\", \"VITB12\"  No results found for: \"VITD\", \"QVITD\", \"XNTI34DC\"    Current Outpatient Medications on File Prior to Visit   Medication Sig Dispense Refill    PANTOPRAZOLE 40 MG Oral Tab EC TAKE 1 TABLET(40 MG) BY MOUTH EVERY MORNING BEFORE BREAKFAST 90 tablet 1    levothyroxine 25 MCG Oral Tab TAKE 1 TABLET(25 MCG) BY MOUTH BEFORE BREAKFAST 90 tablet 1    cyclobenzaprine 10 MG Oral Tab Take 1 tablet (10 mg total) by mouth nightly as needed for Muscle spasms. 30 tablet 1    diclofenac 75 MG Oral Tab EC Take 1 tablet (75 mg total) by mouth 2 (two) times daily. 60 tablet 1    albuterol 108 (90 Base) MCG/ACT Inhalation Aero Soln Inhale 2 puffs into the lungs every 6 (six) hours as needed for Wheezing or Shortness of Breath. 1 each 2     No current facility-administered medications on file prior to visit.       ASSESSMENT  Initial Weight Data and Goal Weight Loss:       Diagnoses and all orders for this visit:    Encounter for therapeutic drug monitoring  -     Hemoglobin A1C; Future  -     Vitamin B12; Future  -     Vitamin D; Future  -     CT CALCIUM SCORING; Future  -     Tirzepatide-Weight Management (ZEPBOUND) 2.5 MG/0.5ML Subcutaneous Solution Auto-injector; Inject 2.5 mg into the skin once a week.  -     Tirzepatide-Weight Management (ZEPBOUND) 5 MG/0.5ML Subcutaneous Solution Auto-injector; Inject 5 mg into the skin once a week. Start after completing full 4 weeks on 2.5 mg weekly dose.    Class 3 severe obesity with serious comorbidity and body mass index (BMI) of 45.0 to 49.9 in adult, unspecified obesity type (HCC)  - Start Zepbound as directed.  - Reviewed balanced plate nutrition with focus on whole food, regular meals daily that include protein and produce and eliminating/reducing late night eating.  - Counseled on the 4  Pillars of health (sleep, stress, nutrition and fitness).  - Reviewed weight synopsis in EMR  - Instructed to use contraception at all times while on AOMs.  -     OP REFERRAL TO [facility] ([facility] USE ONLY)  -     Hemoglobin A1C; Future  -     Vitamin B12; Future  -     Vitamin D; Future  -     OP REFERRAL TO [facility]  -     CT CALCIUM SCORING; Future  -     Tirzepatide-Weight Management (ZEPBOUND) 2.5 MG/0.5ML Subcutaneous Solution Auto-injector; Inject 2.5 mg into the skin once a week.  -     Tirzepatide-Weight Management (ZEPBOUND) 5 MG/0.5ML Subcutaneous Solution Auto-injector; Inject 5 mg into the skin once a week. Start after completing full 4 weeks on 2.5 mg weekly dose.    GIDEON (obstructive sleep apnea)  -     OP REFERRAL TO [facility] ([facility] USE ONLY)  -     Hemoglobin A1C; Future  -     Vitamin B12; Future  -     Vitamin D; Future  -     OP REFERRAL TO [facility]  -     CT CALCIUM SCORING; Future  -     Tirzepatide-Weight Management (ZEPBOUND) 2.5 MG/0.5ML Subcutaneous Solution Auto-injector; Inject 2.5 mg into the skin once a week.  -     Tirzepatide-Weight Management (ZEPBOUND) 5 MG/0.5ML Subcutaneous Solution Auto-injector; Inject 5 mg into the skin once a week. Start after completing full 4 weeks on 2.5 mg weekly dose.    Insulin resistance  -     OP REFERRAL TO [facility] ([facility] USE ONLY)  -     Hemoglobin A1C; Future  -     Vitamin B12; Future  -     Vitamin D; Future  -     OP REFERRAL TO [facility]  -     CT CALCIUM SCORING; Future  -     Tirzepatide-Weight Management (ZEPBOUND) 2.5 MG/0.5ML Subcutaneous Solution Auto-injector; Inject 2.5 mg into the skin once a week.  -     Tirzepatide-Weight Management (ZEPBOUND) 5 MG/0.5ML Subcutaneous Solution Auto-injector; Inject 5 mg into the skin once a week. Start after completing full 4 weeks on 2.5 mg weekly dose.    IFG (impaired fasting glucose)  -     OP REFERRAL TO [facility] ([facility] USE ONLY)  -     Hemoglobin A1C; Future  -     Vitamin  B12; Future  -     Vitamin D; Future  -     OP REFERRAL TO [facility]  -     CT CALCIUM SCORING; Future  -     Tirzepatide-Weight Management (ZEPBOUND) 2.5 MG/0.5ML Subcutaneous Solution Auto-injector; Inject 2.5 mg into the skin once a week.  -     Tirzepatide-Weight Management (ZEPBOUND) 5 MG/0.5ML Subcutaneous Solution Auto-injector; Inject 5 mg into the skin once a week. Start after completing full 4 weeks on 2.5 mg weekly dose.    Gastroesophageal reflux disease, unspecified whether esophagitis present    Perimenopause  -     OP REFERRAL TO [facility]    Family history of heart disease  -     OP REFERRAL TO [facility] ([facility] USE ONLY)  -     Hemoglobin A1C; Future  -     Vitamin B12; Future  -     Vitamin D; Future  -     OP REFERRAL TO [facility]  -     CT CALCIUM SCORING; Future  -     Tirzepatide-Weight Management (ZEPBOUND) 2.5 MG/0.5ML Subcutaneous Solution Auto-injector; Inject 2.5 mg into the skin once a week.  -     Tirzepatide-Weight Management (ZEPBOUND) 5 MG/0.5ML Subcutaneous Solution Auto-injector; Inject 5 mg into the skin once a week. Start after completing full 4 weeks on 2.5 mg weekly dose.        PLAN  Medication use and side effects reviewed with patient.  Medication contraindications: none foreseen  Follow up with dietitian and psychologist as recommended.  Discussed the role of sleep and stress in weight management.  Labs orders as above.  Counseled on comprehensive weight loss plan including attention to nutrition, exercise and behavior/stress management for success. See patient instruction below for more details.  Reviewed previous labs in EMR/Care Everywhere  Weight Loss Contract reviewed and signed.    Patient Instructions   Welcome to the [facility]...your Lifestyle Renovation begins now!  Thank you for placing your trust in our health care team, I look forward to working with you along this journey to better health!    Next steps:     1.  Call our office  at [phone] to schedule a personal nutrition consultation with one of our registered dieticians, Luisana or Yuri. Bring along your food journal (3 days minimum). See journal options below.  2.  Complete non fasting (10-12 hours, water only) labs at [facility] lab site prior to next office visit. Lab results will be communicated via [facility].  3.   Use contraception at all times while on anti-obesity medications.  4.  Complete additional testing as ordered: Follow up with sleep specialist for sleep apnea treatment. Recommend CT heart scan for screening heart disease.  5.  Fill your prescribed medication and take as discussed and prescribed: Start Zepbound at 2.5 mg weekly. After 4 weeks increase to the next dose of 5 mg weekly. If <5# weight loss on 5 mg weekly dose then send [facility] message with current scale weight to determine if a dose adjustment is appropriate. Otherwise plant to maintain this dose until next visit. Any further dose titrations beyond this dose will be considered at appointments only, unless otherwise discussed. Visit the website www.zepbound.Hire An Esquire for coupon and further education on dosing. This medication may require a prior authorization (PA) by your insurance. A PA may take one week plus to complete and our office will be in touch during this process if needed. If cost/supply prohibitive plan: generic alternative to Contrave (www.contrave.com) with Bupropion XL and Naltrexone.    Tips while taking an injectable weight loss medication:    Be an intuitive eater. Listen to your hunger and fullness signals, stopping when you are full.  Consume protein and produce in your day, striving for a rainbow of color of produce.  Reduce portions to staring size of 1 cup size and check in with your gut to see if you are full. Use a sand timer to slow down your eating pace to allow for 15-20 minutes to complete a meal and use the \"2 bite rule\".  Reduce refined sugars and high fat foods, as they may  contribute to greater side effects of nausea and heartburn.  Stop eating 3 hours before bedtime to allow your food to digest.  Remain hydrated with water or non caloric and non caffeine beverages.  Use over the counter raphael lozenge/supplement to help reduce nausea if needed.  If you have been off your medication for more than 2 weeks please notify our office to determine next dosing, as return to previous dose may not be appropriate or tolerated.    Your blood pressure was elevated today at 120/90. Please monitor at home and follow up with your primary care provider.    What is high blood pressure?  High blood pressure, (also referred to as hypertension), is when your blood pressure, the force of blood flowing through your blood vessels, is consistently too high. Learn more about high blood pressure at https://www.heart.org/en/health-topics/high-blood-pressure.    If you have high blood pressure, you are not alone.  Nearly half of American adults have high blood pressure. (Many don’t even know they have it.)  The best way to know if you have high blood pressure it is to have your blood pressure checked.    Know your numbers.  Learn about your blood pressure numbers and what they mean.    BLOOD PRESSURE (BP) CATEGORY SYSTOLIC mm Hg (upper number) And/or DIASTOLIC mm Hg (lower number)   Normal Less than 120 and Less than 80   Elevated 120-129 and Less than 80   High BP: HTN Stage 1 130-139 or 80-89   High BP: HTN Stage 2 140 or higher or 90 or higher   Hypertensive Crisis Higher than 180 And/or Higher than 120     High blood pressure is a 'silent killer.'  Most of the time there are no obvious symptoms.  Certain physical traits and lifestyle choices can put you at a greater risk for high blood pressure.  When left untreated, the damage that high blood pressure does to your circulatory system is a significant contributing factor to heart attack, stroke and other health threats.    Please try to work on the following  dietary changes this first month:    1.  Drink water with meals and throughout the day, cut down on soda and/or juice if consumed. Consider flavored water options like Bubbly, Spindrift, Hint and Wendy.  2.  Have protein with each meal, examples include: greek yogurt, cottage cheese, hard boiled egg, tofu, chicken, fish, or tuna.  3.  Work towards reducing/eliminating refined carbohydrates and sugars which includes items such as sweets, as well as rice, pasta, and bread and make sure to choose whole grain options when having them with just 1 serving per meal about the size of your inner palm.  4.  Consume non starchy veggies daily working towards making them a good 50% of your daily food intake. Add them to lunch and dinner consistently.  5.  Start a daily probiotic: VSL#3 is recommended, (order on line at www.vsl3.com). Take 1 capsule daily with water for 30 days, then reduce to 1 every other day (this will reduce the cost). Capsules can be left out of refrigerator for 2 weeks. I recommend using a pill box weekly and keeping the bottle in the fridge.    Please download jonathon My Fitness Pal, LoseIt! Or Net Diary to monitor daily dietary intake and you will be able to see if you are eating the right amount of calories or too much or too little which would hinder weight loss. Additionally this will help to see your daily carbohydrate and protein intake. When you set the jonathon up choose 1-1.5 lbs/week as a goal.  Keeping a paper food journal is an option as well to remain accountable for your choices- this is the start to mindful eating! A low calorie diet has been consistently shown to support weight loss.    Continue or start exercising to help establish a routine. If not already exercising begin with 1 day/week and progress as able with the goal of working towards 30 minutes 5 days a week at a minimum. A variety or cardio, strength and stretching is important. Review resources below to help support you in building  this healthy routine.    Meditation daily can help manage and control stress. Chronic stress can make weight loss difficult.  Exercising is one way to help with stress, but meditation using the CALM Chandni or another comparable alternative can be done in your home or place of work with little time commitment. This Chandni can also help work on behavior change and improve sleep. Check out the segment under Calm Masterclass and listen to The 4 Pillars of Health. A great way to begin learning about the foundation of lifestyle with practical tips to use in your every day. In addition, we offer counseling services and support for individual connection and care. A referral is necessary so please let me know if this is a service you are interested.    Check out www.yourweightmatters.org blog for continued support and education along your weight loss journey to optimal health!      Patient Resources:    Personal Training/Fitness Classes/Health Coaching    [facility] in [place]: Full fitness center with group fitness and personal training located in [place].  Health Coaching with Aileen Tierney, Yogi Sy, and Tanner Brower at our [facility]- individual coaching to work on your health goals. Call [phone] and/or email @ [email or web address]. Free 60 minute consult when client of [employer] [facility].  [facility] @ http://www.Checkpoint [email or web address]. A variety of group fitness options plus various yoga classes [phone] and/or email Alyce at [email or web address]  [facility] Fitness Centers with multiple locations: Frazr Fitness (www.Housekeep.Carousell), F45 Training (www.p36ukubpgki.Carousell), Fit Body Bootcamp (www.FastPaybodybootcamp.Carousell), [employer] Fitness (www.Capos [place].Carousell), The Exercise  (www.exercisecoach.com), Club Pilates (www.clubpilates.com)    Online Fitness  Fitness  on Lumafit  Fit in 10 DVD series   www.eftbj53MDD.[place]  Chair exercises via  Sit and Be Fit (www.sitandbefit.org) and Rachel Joyce Organic Salon Fitness (www.Cont3nt.com.com) or Joshua Barnes or Trace Pollock videos on YouTube.  Hip Hop Fit with Emeka Reynoso at www.hiphopfit.net    Apps for on the Go Fitness  Neihart 7 Minute Workout (orange box with white 7) - free on the go HIIT training chandni  Peloton Chandni @ www.onepeloton.com    Nutrition Trackers and Programs  LoseIT! And My Fitness Pal apps and on line for tracking nutrition  NOOM - virtual health coaching  FitFoundation (healthy meals on the go) in [place] @ www.sqlpppmribzmh6dThe car easily beat  Bistro MD @ wwwAxis Network Technologybistromd.Travel Distribution Systems and Ukrunt78 (calorie smart and low carb plans recommended) @ OYO Sportstoysmxoibe44.com, Metabolic Meals @ www.Hangout IndustriesMetabolicMeals.Travel Distribution Systems - individual prepared meals to go  Gobble, Blue Apron, Home , Every Plate, Sunbasket- on line meal delivery programs for preparation at home  Meal Village in [place] for homemade meals to go @ wwwAxis Network Technologymealvillage.Travel Distribution Systems  Diet Doctor @ www.dietdoctor.com - low carb swaps  [employer] - meal prep and planning chandni (www.yummly.com)    Stress, Anxiety, Depression, Trauma  CALM meditation chandni (www.calm.com)  Headspace  Don't let anxiety run your life. Using the science of emotion regulation and mindfulness to overcome fear and worry by Hipolito Juárez PsyD and Raúl Prakash MA.  The LetMeHearYa Podcast (September 27, 2023): 6 Magic Words That Stop Anxiety  What Happened to You?- a look at the impact trauma has on behavior written by Kamari Morrison and Dr. Romario Cortés  Whole Again by Johnathan Dorado - discovering your true self after trauma    Mindful Eating/The Hungry Brain  Am I Hungry? Mindful eating virtual  chandni (www.amihungry.com)  The Hungry Brain by Carlee Mark, PhD  Mindless Eating by Hloger Paulson  Weight Loss Surgery Will Not Treat Food Addiction by Aruna Pang Ph.D    Metabolic Dysfunction, Hormones and Cravings  Why We Get Sick? By Chandan Cesar (insulin resistance)  Your Body in Balance: The New Science of  Food, Hormones, and Health by Dr. Vishal Black  The Complete Guide to fasting by Dr. Macias  Fast Like a Girl by Dr. Radha Curry  The Menopause Reset by Dr. Radha Curry  Sugar, Salt & Fat by Debra Aguirre, Ph.D, R.D.  The Truth About Sugar - documentary on sugar (Free on Utube, https://youtu.be/7K4wioiCJ0e)  Reverse Visceral Fat: #1 Way to Increase Your Lifespan & End Inflammation with Dr. Kelechi Bejarano on Utube @ https://youtu.be/nupPRnvUpJY?si=[email or web address]    Nutrition Support  You Are What You Eat - Netfix series on twin study looking at impact of nutrition changes on health  The End of Dieting: How to Live for Life by Dr. Miguel Crowell M.D. or listen to The Shotfarm Podcast Episode 63: Understanding \"Nutritarian\" Eating w/Dr. Miguel Crowell  The Game Changers- Netflix Documentary on plant based nutrition  The Dr. Callejas T5 Wellness Plan by Dr. Kiel Callejas MD  The Complete Guide to fasting by Dr. Macias  @[facility] (Instagram Dietician with support surrounding nutrition and meal prep/planning)    Education, Motivation and Support Resources  Live to 100: Secrets of the Blue Zones - Netflix series on the secrets to communities living over 100 years old  Atomic Habits by Cesar Pressley (a book about taking small steps to promote greater behavior change)   Motivation jonathon (black box with white \")- daily supportive messages sent to your phone  Can't Hurt Me by Hipolito Pickard (a book exploring the power of discipline in achieving your goals)  Fed Up - documentary about obesity (Free on Utube)  Www.yourweightmatters.org - Obesity Action Coalition sponsored Blog posts  Obesity Action Coalition Resources on topics specific to weight management (www.obesityaction.org)  Fitlosophy Fitspiration - journal to better health (journal book found at [employer] in fitness aisle)  Abel Pretty talk titled: The Call to Courage (Netflix)  The Exam Room by the Physician's Committee (Podcast)  Nutrition Facts by Dr. Garces  (Podcast)      Balanced Nutrition includes:     Build the mentality of Food 4 Fuel. Clean eating with whole foods and eliminating/reducing ultra processed foods.  Be an intuitive eater and using mindful eating practices.  Eat a balanced plate with protein and produce at all meals: 1/4 plate- protein, 1/2 plate non starchy veggies, and 1/4 plate fruit or complex carbohydrate.  Drink water with all meals and use a salad plate to naturally reduce portions.  Eliminate/reduce late night eating by stopping after 7pm. Allowing your body to fast for 12 hours (drink only water, tea or black coffee without any additives).            Is too little sleep a cause of weight gain?  It might be. Recent studies have suggested an association between sleep duration and weight gain. Sleeping less than five hours -- or more than nine hours -- a night appears to increase the likelihood of weight gain.  In one study, recurrent sleep deprivation in men increased their preferences for high-calorie foods and their overall calorie intake. In another study, women who slept less than six hours a night or more than nine hours were more likely to gain 11 pounds (5 kilograms) compared with women who slept seven hours a night. Other studies have found similar patterns in children and adolescents.  One explanation might be that sleep duration affects hormones regulating hunger -- ghrelin and leptin -- and stimulates the appetite. Another contributing factor might be that lack of sleep leads to fatigue and results in less physical activity.  So now you have another reason to get a good night's sleep.    Taken from [facility]-  Kamran Silverio M.D.         Return in about 4 months (around 2/7/2025) for weight management via clinic or VV.    Patient verbalizes understanding.    ANTWAN Andrew  10/7/2024    DOCUMENTATION OF TIME SPENT: Code selection for this visit was based on time spent : 60 minutes on date of service in preparing to see the  patient, obtaining and/or reviewing separately obtained history, performing a medically appropriate examination, counseling and educating the patient/family/caregiver, ordering medications or testing, referring and communicating with other healthcare providers, documenting clinical information in the electronic medical record, independently interpreting results and communicating results to the patient/family/caregiver and care coordination with the patient's other providers.

## 2024-10-07 NOTE — PATIENT INSTRUCTIONS
Welcome to the [facility]...your Lifestyle Renovation begins now!  Thank you for placing your trust in our health care team, I look forward to working with you along this journey to better health!    Next steps:     1.  Call our office at [phone] to schedule a personal nutrition consultation with one of our registered dieticians, Luisana Welch. Bring along your food journal (3 days minimum). See journal options below.  2.  Complete non fasting (10-12 hours, water only) labs at [facility] lab site prior to next office visit. Lab results will be communicated via Adwanted.  3.   Use contraception at all times while on anti-obesity medications.  4.  Complete additional testing as ordered: Follow up with sleep specialist for sleep apnea treatment. Recommend CT heart scan for screening heart disease.  5.  Fill your prescribed medication and take as discussed and prescribed: Start Zepbound at 2.5 mg weekly. After 4 weeks increase to the next dose of 5 mg weekly. If <5# weight loss on 5 mg weekly dose then send Adwanted message with current scale weight to determine if a dose adjustment is appropriate. Otherwise plant to maintain this dose until next visit. Any further dose titrations beyond this dose will be considered at appointments only, unless otherwise discussed. Visit the website www.zepbound.Basho Technologies.Idomoo for coupon and further education on dosing. This medication may require a prior authorization (PA) by your insurance. A PA may take one week plus to complete and our office will be in touch during this process if needed. If cost/supply prohibitive plan: generic alternative to Contrave (www.contrave.com) with Bupropion XL and Naltrexone.    Tips while taking an injectable weight loss medication:    Be an intuitive eater. Listen to your hunger and fullness signals, stopping when you are full.  Consume protein and produce in your day, striving for a rainbow of color of produce.  Reduce  portions to staring size of 1 cup size and check in with your gut to see if you are full. Use a sand timer to slow down your eating pace to allow for 15-20 minutes to complete a meal and use the \"2 bite rule\".  Reduce refined sugars and high fat foods, as they may contribute to greater side effects of nausea and heartburn.  Stop eating 3 hours before bedtime to allow your food to digest.  Remain hydrated with water or non caloric and non caffeine beverages.  Use over the counter raphael lozenge/supplement to help reduce nausea if needed.  If you have been off your medication for more than 2 weeks please notify our office to determine next dosing, as return to previous dose may not be appropriate or tolerated.    Your blood pressure was elevated today at 120/90. Please monitor at home and follow up with your primary care provider.    What is high blood pressure?  High blood pressure, (also referred to as hypertension), is when your blood pressure, the force of blood flowing through your blood vessels, is consistently too high. Learn more about high blood pressure at https://www.heart.org/en/health-topics/high-blood-pressure.    If you have high blood pressure, you are not alone.  Nearly half of American adults have high blood pressure. (Many don’t even know they have it.)  The best way to know if you have high blood pressure it is to have your blood pressure checked.    Know your numbers.  Learn about your blood pressure numbers and what they mean.    BLOOD PRESSURE (BP) CATEGORY SYSTOLIC mm Hg (upper number) And/or DIASTOLIC mm Hg (lower number)   Normal Less than 120 and Less than 80   Elevated 120-129 and Less than 80   High BP: HTN Stage 1 130-139 or 80-89   High BP: HTN Stage 2 140 or higher or 90 or higher   Hypertensive Crisis Higher than 180 And/or Higher than 120     High blood pressure is a 'silent killer.'  Most of the time there are no obvious symptoms.  Certain physical traits and lifestyle choices can  put you at a greater risk for high blood pressure.  When left untreated, the damage that high blood pressure does to your circulatory system is a significant contributing factor to heart attack, stroke and other health threats.    Please try to work on the following dietary changes this first month:    1.  Drink water with meals and throughout the day, cut down on soda and/or juice if consumed. Consider flavored water options like Bubbly, Spindrift, Hint and Wendy.  2.  Have protein with each meal, examples include: greek yogurt, cottage cheese, hard boiled egg, tofu, chicken, fish, or tuna.  3.  Work towards reducing/eliminating refined carbohydrates and sugars which includes items such as sweets, as well as rice, pasta, and bread and make sure to choose whole grain options when having them with just 1 serving per meal about the size of your inner palm.  4.  Consume non starchy veggies daily working towards making them a good 50% of your daily food intake. Add them to lunch and dinner consistently.  5.  Start a daily probiotic: VSL#3 is recommended, (order on line at www.vsl3.com). Take 1 capsule daily with water for 30 days, then reduce to 1 every other day (this will reduce the cost). Capsules can be left out of refrigerator for 2 weeks. I recommend using a pill box weekly and keeping the bottle in the fridge.    Please download jonathon My Fitness Pal, LoseIt! Or Net Diary to monitor daily dietary intake and you will be able to see if you are eating the right amount of calories or too much or too little which would hinder weight loss. Additionally this will help to see your daily carbohydrate and protein intake. When you set the jonathon up choose 1-1.5 lbs/week as a goal.  Keeping a paper food journal is an option as well to remain accountable for your choices- this is the start to mindful eating! A low calorie diet has been consistently shown to support weight loss.    Continue or start exercising to help establish a  routine. If not already exercising begin with 1 day/week and progress as able with the goal of working towards 30 minutes 5 days a week at a minimum. A variety or cardio, strength and stretching is important. Review resources below to help support you in building this healthy routine.    Meditation daily can help manage and control stress. Chronic stress can make weight loss difficult.  Exercising is one way to help with stress, but meditation using the CALM Chandni or another comparable alternative can be done in your home or place of work with little time commitment. This Chandni can also help work on behavior change and improve sleep. Check out the segment under Calm Masterclass and listen to The 4 Pillars of Health. A great way to begin learning about the foundation of lifestyle with practical tips to use in your every day. In addition, we offer counseling services and support for individual connection and care. A referral is necessary so please let me know if this is a service you are interested.    Check out www.yourweightmatters.org blog for continued support and education along your weight loss journey to optimal health!      Patient Resources:    Personal Training/Fitness Classes/Health Coaching    [facility] in [place]: Full fitness center with group fitness and personal training located in [place].  Health Coaching with Aileen Tierney, Yogi Sy, and Tanner Brower at our [facility]- individual coaching to work on your health goals. Call [phone] and/or email @ [email or web address]. Free 60 minute consult when client of [facility].  JENIFER Johnston @ http://www.Broadcast [employer] Weather [employer] Channel Branding Graphics Display SystemVeruta.Advanced Image Enhancement. A variety of group fitness options plus various yoga classes [phone] and/or email Alyce at [email or web address]  [facility] Fitness Centers with multiple locations: [place] [facility]itness (www.SAK Project.Advanced Image Enhancement), F45 Training (www.x48pgxkstnz.Advanced Image Enhancement), Fit Body  Bootcamp (www.Toygaroo.comp.Apptopia), [employer] Services Fitness (www.Cashpath Financial.Apptopia), The Exercise  (www.exercisecoach.com), Club Pilates (www.clubpilates.Apptopia)    Online Fitness  Fitness  on Utube  Fit in 10 DVD series   www.wcjqz62RJD.Apptopia  Chair exercises via Sit and Be Fit (www.sitandbefit.org) and GroupTalent (www.Alyotech.com) or Joshua Barnes or Trace Pollock videos on YouTube.  Hip Hop Fit with Emeka Reynoso at www.hiphopfit.net    Apps for on the Go Fitness  [employer] 7 Minute Workout (orange box with white 7) - free on the go HIIT training chandni  Peloton Chandni @ www.onepeloton.com    Nutrition Trackers and Programs  LoseIT! And My Fitness Pal apps and on line for tracking nutrition  NOOM - virtual health coaching  FitFoundation (healthy meals on the go) in [place] @ www.azeeioxjskzbx3nVoÃ¶lks  Zachary MOORE @ The Stormfire GroupbistrPrizeBoxâ„¢ and Oacjjd40 (calorie smart and low carb plans recommended) @ wwwb3 biotwlzmw80.com, Metabolic Meals @ www.MyMetabolicMeals.com - individual prepared meals to go  Gobble, Blue Apron, Home , Every Plate, Sunbasket- on line meal delivery programs for preparation at home  [facility] [place] in [place] for homemade meals to go @ www.mealDownrange Enterprisesage.Apptopia  Diet Doctor @ www.dietdoctor.com - low carb swaps  Binary Thumb - meal prep and planning chandni (www.yummly.com)    Stress, Anxiety, Depression, Trauma  CALM meditation channdi (www.calm.com)  Headspace  Don't let anxiety run your life. Using the science of emotion regulation and mindfulness to overcome fear and worry by Hipolito Juárez PsyD and Raúl Prakash MA.  The Healthline Networks Podcast (September 27, 2023): 6 Magic Words That Stop Anxiety  What Happened to You?- a look at the impact trauma has on behavior written by Kamari Morrison and Dr. Romario Cortés  Whole Again by Johnathan Dorado - discovering your true self after trauma    Mindful Eating/The Hungry Brain  Am I Hungry? Mindful eating virtual  chandni (www.amihungry.com)  The Hungry Brain by Carlee  Deedee, PhD  Mindless Eating by Holger Paulson  Weight Loss Surgery Will Not Treat Food Addiction by Aruna Pang Ph.D    Metabolic Dysfunction, Hormones and Cravings  Why We Get Sick? By Chandan Cesar (insulin resistance)  Your Body in Balance: The New Science of Food, Hormones, and Health by Dr. Vishal Black  The Complete Guide to fasting by Dr. Macias  Fast Like a Girl by Dr. Radha Curry  The Menopause Reset by Dr. Radha Curry  Sugar, Salt & Fat by Debra Aguirre, Ph.D, R.D.  The Truth About Sugar - documentary on sugar (Free on Utube, https://youTriplePulseu.be/7V2qjwgYH9d)  Reverse Visceral Fat: #1 Way to Increase Your Lifespan & End Inflammation with Dr. Kelechi Bejarano on Utube @ https://youTriplePulseu.be/nupPRnvUpJY?si=dv5tajMdOZB8AxkW    Nutrition Support  You Are What You Eat - Netfix series on twin study looking at impact of nutrition changes on health  The End of Dieting: How to Live for Life by Dr. Miguel Crowell M.D. or listen to The Cognii Podcast Episode 63: Understanding \"Nutritarian\" Eating w/Dr. Miguel Crowell  The Game Changers- 3Play Mediaix Documentary on plant based nutrition  The Dr. Callejas  Wellness Plan by Dr. Kiel Callejas MD  The Complete Guide to fasting by Dr. Macias  @[email or web address] (Instagram Dietician with support surrounding nutrition and meal prep/planning)    Education, Motivation and Support Resources  Live to 100: Secrets of the Blue Zones - Netflix series on the secrets to communities living over 100 years old  Atomic Habits by Cesar Pressley (a book about taking small steps to promote greater behavior change)   Motivation jonathon (black box with white \")- daily supportive messages sent to your phone  Can't Hurt Me by Hipolito Pickard (a book exploring the power of discipline in achieving your goals)  Fed Up - documentary about obesity (Free on Utube)  Www.yourweightmatters.org - Obesity Action Coalition sponsored Blog posts  Obesity Action Coalition Resources on topics specific to weight management  (www.obesityaction.org)  Fitlosophy Fitspiration - journal to better health (journal book found at [employer] in fitness aisle)  Abel Pretty talk titled: The Call to Courage (Netflix)  The Exam Room by the Physician's Committee (Podcast)  Nutrition Facts by Dr. Garces (Podcast)      Balanced Nutrition includes:     Build the mentality of Food 4 Fuel. Clean eating with whole foods and eliminating/reducing ultra processed foods.  Be an intuitive eater and using mindful eating practices.  Eat a balanced plate with protein and produce at all meals: 1/4 plate- protein, 1/2 plate non starchy veggies, and 1/4 plate fruit or complex carbohydrate.  Drink water with all meals and use a salad plate to naturally reduce portions.  Eliminate/reduce late night eating by stopping after 7pm. Allowing your body to fast for 12 hours (drink only water, tea or black coffee without any additives).            Is too little sleep a cause of weight gain?  It might be. Recent studies have suggested an association between sleep duration and weight gain. Sleeping less than five hours -- or more than nine hours -- a night appears to increase the likelihood of weight gain.  In one study, recurrent sleep deprivation in men increased their preferences for high-calorie foods and their overall calorie intake. In another study, women who slept less than six hours a night or more than nine hours were more likely to gain 11 pounds (5 kilograms) compared with women who slept seven hours a night. Other studies have found similar patterns in children and adolescents.  One explanation might be that sleep duration affects hormones regulating hunger -- ghrelin and leptin -- and stimulates the appetite. Another contributing factor might be that lack of sleep leads to fatigue and results in less physical activity.  So now you have another reason to get a good night's sleep.    Taken from Christo Silverio M.D.

## 2024-10-09 ENCOUNTER — PATIENT MESSAGE (OUTPATIENT)
Dept: INTERNAL MEDICINE CLINIC | Facility: CLINIC | Age: 49
End: 2024-10-09

## 2024-10-10 DIAGNOSIS — E03.9 HYPOTHYROIDISM, UNSPECIFIED TYPE: Primary | ICD-10-CM

## 2024-10-10 RX ORDER — LEVOTHYROXINE SODIUM 25 UG/1
25 TABLET ORAL
Qty: 90 TABLET | Refills: 1 | Status: SHIPPED | OUTPATIENT
Start: 2024-10-10

## 2024-10-10 NOTE — TELEPHONE ENCOUNTER
Prior Authorization History  Tirzepatide-Weight Management (ZEPBOUND) 2.5 MG/0.5ML Subcutaneous Solution Auto-injector     Approval Details    Authorized from September 9, 2024 to October 9, 2025  Information received electronically from payer

## 2024-11-04 ENCOUNTER — LAB ENCOUNTER (OUTPATIENT)
Dept: LAB | Age: 49
End: 2024-11-04
Attending: NURSE PRACTITIONER
Payer: COMMERCIAL

## 2024-11-04 DIAGNOSIS — E66.01 CLASS 3 SEVERE OBESITY WITH SERIOUS COMORBIDITY AND BODY MASS INDEX (BMI) OF 45.0 TO 49.9 IN ADULT, UNSPECIFIED OBESITY TYPE (HCC): ICD-10-CM

## 2024-11-04 DIAGNOSIS — Z82.49 FAMILY HISTORY OF HEART DISEASE: ICD-10-CM

## 2024-11-04 DIAGNOSIS — E06.3 AUTOIMMUNE HYPOTHYROIDISM: ICD-10-CM

## 2024-11-04 DIAGNOSIS — G47.33 OSA (OBSTRUCTIVE SLEEP APNEA): ICD-10-CM

## 2024-11-04 DIAGNOSIS — E88.819 INSULIN RESISTANCE: ICD-10-CM

## 2024-11-04 DIAGNOSIS — Z51.81 ENCOUNTER FOR THERAPEUTIC DRUG MONITORING: ICD-10-CM

## 2024-11-04 DIAGNOSIS — R73.01 IFG (IMPAIRED FASTING GLUCOSE): ICD-10-CM

## 2024-11-04 DIAGNOSIS — E66.813 CLASS 3 SEVERE OBESITY WITH SERIOUS COMORBIDITY AND BODY MASS INDEX (BMI) OF 45.0 TO 49.9 IN ADULT, UNSPECIFIED OBESITY TYPE (HCC): ICD-10-CM

## 2024-11-04 LAB
EST. AVERAGE GLUCOSE BLD GHB EST-MCNC: 120 MG/DL (ref 68–126)
HBA1C MFR BLD: 5.8 % (ref ?–5.7)
TSI SER-ACNC: 4.1 UIU/ML (ref 0.55–4.78)
VIT B12 SERPL-MCNC: 402 PG/ML (ref 211–911)
VIT D+METAB SERPL-MCNC: 20.6 NG/ML (ref 30–100)

## 2024-11-04 PROCEDURE — 82306 VITAMIN D 25 HYDROXY: CPT

## 2024-11-04 PROCEDURE — 84443 ASSAY THYROID STIM HORMONE: CPT

## 2024-11-04 PROCEDURE — 82607 VITAMIN B-12: CPT

## 2024-11-04 PROCEDURE — 83036 HEMOGLOBIN GLYCOSYLATED A1C: CPT

## 2024-11-04 PROCEDURE — 36415 COLL VENOUS BLD VENIPUNCTURE: CPT

## 2024-11-05 ENCOUNTER — TELEPHONE (OUTPATIENT)
Dept: FAMILY MEDICINE CLINIC | Facility: CLINIC | Age: 49
End: 2024-11-05

## 2024-11-05 DIAGNOSIS — E88.819 INSULIN RESISTANCE: ICD-10-CM

## 2024-11-05 DIAGNOSIS — E03.9 HYPOTHYROIDISM, UNSPECIFIED TYPE: Primary | ICD-10-CM

## 2024-11-05 DIAGNOSIS — E78.2 MIXED HYPERLIPIDEMIA: ICD-10-CM

## 2024-11-05 NOTE — TELEPHONE ENCOUNTER
----- Message from Don Grimes sent at 11/5/2024 10:48 AM CST -----  Triage place annual lab orders for 6mo.

## 2024-11-09 DIAGNOSIS — E55.9 VITAMIN D DEFICIENCY: ICD-10-CM

## 2024-11-09 DIAGNOSIS — R73.03 PREDIABETES: Primary | ICD-10-CM

## 2024-11-09 RX ORDER — ERGOCALCIFEROL 1.25 MG/1
50000 CAPSULE, LIQUID FILLED ORAL WEEKLY
Qty: 12 CAPSULE | Refills: 1 | Status: SHIPPED | OUTPATIENT
Start: 2024-11-09

## 2024-12-03 ENCOUNTER — OFFICE VISIT (OUTPATIENT)
Dept: OBGYN CLINIC | Facility: CLINIC | Age: 49
End: 2024-12-03
Payer: COMMERCIAL

## 2024-12-03 VITALS
HEIGHT: 65 IN | WEIGHT: 278 LBS | SYSTOLIC BLOOD PRESSURE: 126 MMHG | BODY MASS INDEX: 46.32 KG/M2 | DIASTOLIC BLOOD PRESSURE: 74 MMHG | HEART RATE: 82 BPM

## 2024-12-03 DIAGNOSIS — N95.1 PERIMENOPAUSE: ICD-10-CM

## 2024-12-03 DIAGNOSIS — Z01.419 WELL WOMAN EXAM WITH ROUTINE GYNECOLOGICAL EXAM: Primary | ICD-10-CM

## 2024-12-03 DIAGNOSIS — Z12.4 SCREENING FOR CERVICAL CANCER: ICD-10-CM

## 2024-12-03 DIAGNOSIS — Z11.51 SCREENING FOR HUMAN PAPILLOMAVIRUS (HPV): ICD-10-CM

## 2024-12-03 PROCEDURE — 3074F SYST BP LT 130 MM HG: CPT | Performed by: NURSE PRACTITIONER

## 2024-12-03 PROCEDURE — 3078F DIAST BP <80 MM HG: CPT | Performed by: NURSE PRACTITIONER

## 2024-12-03 PROCEDURE — 87624 HPV HI-RISK TYP POOLED RSLT: CPT | Performed by: NURSE PRACTITIONER

## 2024-12-03 PROCEDURE — 88175 CYTOPATH C/V AUTO FLUID REDO: CPT | Performed by: NURSE PRACTITIONER

## 2024-12-03 PROCEDURE — 99396 PREV VISIT EST AGE 40-64: CPT | Performed by: NURSE PRACTITIONER

## 2024-12-03 PROCEDURE — 3008F BODY MASS INDEX DOCD: CPT | Performed by: NURSE PRACTITIONER

## 2024-12-03 PROCEDURE — 99459 PELVIC EXAMINATION: CPT | Performed by: NURSE PRACTITIONER

## 2024-12-03 NOTE — PROGRESS NOTES
Subjective:  Chief Complaint   Patient presents with    Annual     49 year old female  presents for annual.    Pt has questions about symptoms or perimenopause    Patient's last menstrual period was 2024 (exact date).  Hx Prior Abnormal Pap: No  Pap Result Notes: Pt is unsure but stateslast pap was approximately 2 years ago  Menarche: 9 (12/3/2024  4:32 PM)  Period Cycle (Days): [place] - Varies (12/3/2024  4:32 PM)  Period Duration (Days): 4-5 days (12/3/2024  4:32 PM)  Period Flow: Moderate (12/3/2024  4:32 PM)  Use of Birth Control (if yes, specify type): None (12/3/2024  4:32 PM)  Hx Prior Abnormal Pap: No (12/3/2024  4:32 PM)  Pap Result Notes: Pt is unsure but stateslast pap was approximately 2 years ago (12/3/2024  4:32 PM)    Last Mammo:  2024, needs  Additional views     Feeling safe at home.      Most Recent Immunizations   Administered Date(s) Administered    Zoster Vaccine Recombinant Adjuvanted (Shingrix) 2024      reports that she quit smoking about 25 years ago. Her smoking use included cigarettes. She started smoking about 35 years ago. She has never used smokeless tobacco.   reports current alcohol use of about 3.0 standard drinks of alcohol per week.    Past Medical History:    GERD (gastroesophageal reflux disease)    Hiatal hernia    Obesity     Past Surgical History:   Procedure Laterality Date             Review of Systems:  Pertinent items are noted in the HPI.    Objective:  /74   Pulse 82   Ht 65\"   Wt 278 lb (126.1 kg)   LMP 2024 (Exact Date)   BMI 46.26 kg/m²    Physical Examination:  General appearance: Well dressed, well nourished in no apparent distress  Neurologic/Psychiatric: Alert and oriented to person, place and time, mood normal, affect appropriate  Head: Normocephalic without obvious deformity, atraumatic  Neck: No thyromegaly, supple, non-tender, no masses, no adenopathy  Lungs: Clear to auscultation bilaterally, no rales,  wheezes or rhonchi  Breasts: Symmetric, non-tender, no masses, lesions, retraction, dimpling or discharge bilaterally, no axillary or supraclavicular lymphadenopathy  Heart: Regular rate and rhythm, no gallops or murmurs  Abdomen: Soft, non-tender, non-distended, no masses, no hepatosplenomegaly, no hernias, no inguinal lymphadenopathy  Pelvic:    External genitalia- Normal, Bartholin's, urethra, skeins glands normal   Vagina- No vaginal lesions, physiologic discharge   Urethra- Non-tender, no masses   Urethral Meatus- No lesions or masses, no prolapse   Bladder- Non-tender, no masses   Cervix- No lesions, long/closed, no cervical motion tenderness   Uterus- Normal sized, non-tender, no masses   Adnexa-  Non-tender, no masses   Anus/Perineum- Normal, no masses or lesions  Extremities: Non-tender, full range of motion, no clubbing, cyanosis or edema  Skin:  General inspection- no rashes, lesions or discoloration    Assessment/Plan:  Normal well-woman exam.  Yearly mammogram needs diag mammo order previously placed  Pap smear obtained.      Declined chaperone for exam today     Diagnoses and all orders for this visit:    Well woman exam with routine gynecological exam  - self breast exam discussed and encouraged  - reminded pt to complete diag mammo    Screening for cervical cancer  -     ThinPrep PAP Smear; Future  - ASCCP pap guidelines discussed, pt elects pap today    Screening for human papillomavirus (HPV)  -     Hpv Dna  High Risk , Thin Prep Collect; Future    Perimenopause  - discussed s/s perimenopause  - discussed diet and lifestyle change  - can also consider estroven       Return in about 1 year (around 12/3/2025) for annual well woman exam or sooner if needed.

## 2024-12-04 DIAGNOSIS — K21.9 GASTROESOPHAGEAL REFLUX DISEASE, UNSPECIFIED WHETHER ESOPHAGITIS PRESENT: ICD-10-CM

## 2024-12-04 LAB — HPV E6+E7 MRNA CVX QL NAA+PROBE: NEGATIVE

## 2024-12-04 RX ORDER — PANTOPRAZOLE SODIUM 40 MG/1
40 TABLET, DELAYED RELEASE ORAL
Qty: 90 TABLET | Refills: 1 | Status: SHIPPED | OUTPATIENT
Start: 2024-12-04

## 2024-12-10 LAB
.: NORMAL
.: NORMAL

## 2025-02-13 ENCOUNTER — OFFICE VISIT (OUTPATIENT)
Dept: INTERNAL MEDICINE CLINIC | Facility: CLINIC | Age: 50
End: 2025-02-13
Payer: COMMERCIAL

## 2025-02-13 VITALS
HEIGHT: 64 IN | DIASTOLIC BLOOD PRESSURE: 86 MMHG | RESPIRATION RATE: 16 BRPM | HEART RATE: 84 BPM | SYSTOLIC BLOOD PRESSURE: 138 MMHG | WEIGHT: 266 LBS | BODY MASS INDEX: 45.41 KG/M2

## 2025-02-13 DIAGNOSIS — E66.813 CLASS 3 SEVERE OBESITY WITH SERIOUS COMORBIDITY AND BODY MASS INDEX (BMI) OF 45.0 TO 49.9 IN ADULT, UNSPECIFIED OBESITY TYPE (HCC): ICD-10-CM

## 2025-02-13 DIAGNOSIS — E55.9 VITAMIN D DEFICIENCY: ICD-10-CM

## 2025-02-13 DIAGNOSIS — Z51.81 ENCOUNTER FOR THERAPEUTIC DRUG MONITORING: Primary | ICD-10-CM

## 2025-02-13 DIAGNOSIS — Z82.49 FAMILY HISTORY OF HEART DISEASE: ICD-10-CM

## 2025-02-13 DIAGNOSIS — G47.33 OSA (OBSTRUCTIVE SLEEP APNEA): ICD-10-CM

## 2025-02-13 DIAGNOSIS — E66.01 CLASS 3 SEVERE OBESITY WITH SERIOUS COMORBIDITY AND BODY MASS INDEX (BMI) OF 45.0 TO 49.9 IN ADULT, UNSPECIFIED OBESITY TYPE (HCC): ICD-10-CM

## 2025-02-13 DIAGNOSIS — R73.03 PREDIABETES: ICD-10-CM

## 2025-02-13 RX ORDER — TIRZEPATIDE 7.5 MG/.5ML
7.5 INJECTION, SOLUTION SUBCUTANEOUS WEEKLY
Qty: 2 ML | Refills: 0 | Status: SHIPPED | OUTPATIENT
Start: 2025-02-13

## 2025-02-13 RX ORDER — TIRZEPATIDE 10 MG/.5ML
10 INJECTION, SOLUTION SUBCUTANEOUS WEEKLY
Qty: 2 ML | Refills: 3 | Status: SHIPPED | OUTPATIENT
Start: 2025-02-13

## 2025-02-13 RX ORDER — TIRZEPATIDE 5 MG/.5ML
5 INJECTION, SOLUTION SUBCUTANEOUS WEEKLY
Qty: 2 ML | Refills: 3 | Status: CANCELLED | OUTPATIENT
Start: 2025-02-13

## 2025-02-13 NOTE — PROGRESS NOTES
Steven Hinton is a 49 year old female presents today for follow-up on medical weight loss program for the treatment of overweight, obesity, or morbid obesity with associated GIDEON, Prediabetes.    S:  Current weight   Wt Readings from Last 6 Encounters:   02/13/25 266 lb (120.7 kg)   12/03/24 278 lb (126.1 kg)   10/07/24 289 lb (131.1 kg)   07/03/24 282 lb (127.9 kg)   03/19/24 270 lb (122.5 kg)   02/22/24 268 lb (121.6 kg)    AND BMI Body mass index is 45.66 kg/m²..    Patient has lost 23# since LOV 4 month ago as NP consult. She has been compliant with therapy. She has been tolerating Zepbound well without reported SEs. Reduction in food portions and cravings noted. Pt with hx of sleep apnea, no SS to review. No use of CPAP.    Testing/consult completed since LOV: Dietician: no.  Labs: yes, reviewed in EMR. Additional Testing Completed: recommend CT heart scan - no.    Labs: prediabetes, Vitamin D deficiency    [facility] Medical Weight Loss Follow Up    Question 2/12/2025  1:49 PM CST - Filed by Patient   Please describe a success moment: Able to move/walk better while on vacation   Please describe a challenging moment/needs for improvement: Choices of food while on cruise ship was tempting   Please complete this 24 hour food journal, listing everything you had to eat in the past day. Include the average time of day you ate these meals at    List foods, qty and prep for breakfast: 2 eggs bites, 2 sausage patties   List foods, qty and prep for lunch. Lean cuisine, ravioli   List foods, qty and prep for dinner. Barbecue chicken breast, salad with croutons and blue cheese dressing   List foods, qty and prep for snacks. 3 pretzel rods, 4 cubes cheddar cheese   List the types and qty of fluids consumed Large coffee with flavored creamer, can diet coke, 3 bottles water   On average, how many meals did you eat out per week? 2   Exercise    How many days per week are you active or exercise 3   On average, how many days were  anaerobic (strength/resistance) exercises performed? 0   On average, how many days were aerobic (cardio) exercises performed? 3   Perceived level of exertion on a scale of 1-5, with 5 being very intense: 4   Stress    Average stress level on a scale of 1-10, with 10 being extremely stressed: 8   If greater than 5/1O how would you grade your coping mechanisms? fair   Sleep hours and integrity    How many hours of uninterrupted sleep do you get a night: 6   Do you feel rested in the morning: No   If no, what may have been disrupting your sleep? Unsure   Please list any goal(s) for your next visit Continue on my journey     Social hx and PMH reviewed. Employed as  for [employer] with [employer] department. In relationship with boy friend who had bariatric surgery.    REVIEW OF SYSTEMS:  GENERAL: feels well otherwise  EYES: denies vision changes or high pain/pressure.  LUNGS: denies shortness of breath with exertion  CARDIOVASCULAR: denies chest pain on exertion, denies palpitations or pedal edema  GI: denies abdominal pain.  No N/V/D/C  MUSCULOSKELETAL: no acute joint or muscle pain  NEURO: denies headaches  PSYCH: denies change in behavior or mood, denies feeling sad or depressed    EXAM:  /86   Pulse 84   Resp 16   Ht 5' 4\" (1.626 m)   Wt 266 lb (120.7 kg)   LMP 09/25/2024 (Exact Date)   BMI 45.66 kg/m²    GENERAL: well developed, well nourished, in no apparent distress, morbidly obese  EYES: conjunctiva pink, sclera non icteric, PERRLA  LUNGS: CTA in all fields, breathing non labored  CARDIO: RRR without murmur, normal S1 and S2 without clicks or gallops, no pedal edema.  GI: +BS  NEURO/MS: motor and sensory grossly intact  PSYCH: pleasant, cooperative, normal mood and affect    ASSESSMENT AND PLAN:  Reviewed Initial Weight Data and Goal Weight Loss:       Encounter Diagnoses   Name Primary?    Encounter for therapeutic drug monitoring Yes    Class 3 severe obesity with serious comorbidity and  body mass index (BMI) of 45.0 to 49.9 in adult, unspecified obesity type (HCC)     GIDEON (obstructive sleep apnea)     Prediabetes     Vitamin D deficiency     Family history of heart disease        No orders of the defined types were placed in this encounter.      Meds & Refills for this Visit:  Requested Prescriptions     Signed Prescriptions Disp Refills    Tirzepatide-Weight Management (ZEPBOUND) 10 MG/0.5ML Subcutaneous Solution Auto-injector 2 mL 3     Sig: Inject 10 mg into the skin once a week. Start after completing full 4 weeks on 7.5 mg weekly dose.    Tirzepatide-Weight Management (ZEPBOUND) 7.5 MG/0.5ML Subcutaneous Solution Auto-injector 2 mL 0     Sig: Inject 7.5 mg into the skin once a week.       Imaging & Consults:  OP REFERRAL HOME SLEEP APNEA TEST [place]      Plan:  Patient has lost 23# since LOV 4 month ago on Zepbound 5 mg weekly with a total weight loss of 23# since initial consult on 10/7/24 with initial weight of 289# and historical baseline BMI 50.61.  Labs reviewed. Weight loss goal: improve health and increase exercise.  Increase Zepbound as directed. Refer for sleep study.  on PB eating with protein recommendations. See patient instructions below for additional plans and patient counseling.      Patient Instructions   Continue making lifestyle changes that focus on good nutrition, regular exercise and stress management.    Medication Plan: Finish up Zepbound 5 mg weekly pens and then increase to 7.5 mg weekly x4 weeks and then increase to 10 mg weekly.    Tips while taking an injectable medication:    Be an intuitive eater. Listen to your hunger and fullness signals, stopping when you are full.  Consume protein and produce in your day, striving for a rainbow of color of produce.  Reduce portions to staring size of 1 cup size and check in with your gut to see if you are full. Set the timer to slow down your eating pace to allow for 15-20 minutes to complete a meal. Recommend  following the \"2 bite rule\".  Reduce refined sugars and high fat foods, as they may contribute to greater side effects of nausea and heartburn.  Stop eating 3 hours before bedtime to allow your food to digest.  Remain hydrated with water or non caloric and non caffeine beverages.  Use over the counter raphael lozenge/supplement to help reduce nausea if needed.  If you have been off your medication for more than 2 weeks please notify our office to determine next dosing, as return to previous dose may not be appropriate or tolerated.    Next steps to work on before next office visit include: Recommend repeat home sleep study to assess current state of sleep apnea. Recommend dietician consult with support balanced nutrition. Some tips are listed below encouraging a reduction in processed foods.    I also recommend modifying nutrition with a focus on Food 4 Fuel and eating clean, lean and green!     Eat whole foods (think farm to table sources) and minimize/eliminate processed and ultra processed foods.  Eat lean sources of protein, recommending incorporating plant based options (no fat/cholesterol in plants) and focus on lean animal protein sources such as eggs, chicken and fish. Minimize beef such as pork and red meat to 1 serving/week.  Increase the consumption of plant based foods with a goal of making 85% of your daily nutrition from plants. Plant based foods are less calorically dense and more nutritionally dense. They do not have fat, which can contribute to insulin resistance and elevated cholesterol. Fruits and vegetables provide an array of vitamins, minerals, phytonutrients (plant protectors) and antiinflammatory properties. All these components help to prevent disease and help your body to work properly!      Re-thinking Nutrition: Learning to See Food as Fuel  October 8, 2019  Posted in Blog, Nutrition  By Your Weight Matters Campaign    “Dieting” can start to feel challenging when we begin to associate  healthy behaviors with “punishment.” Too often, we overlook the real reason we eat food. It's not only meant to be enjoyed, but also to provide basic fuel for our bodies.  Learning to see food as fuel can help you find balance in your journey with weight. It will teach you about the primal purpose of food, the effect certain foods have on health, and how to listen carefully to what your body is trying to tell you.  It Starts with Cells  Did you know your body has more than 35 trillion cells? Each one serves a purpose.  Once a cell has completed its purpose, it dies. Your body replaces dead and worn-out cells with new and energetic ones. It also depends on this ongoing cell cycle to keep you healthy. And guess what? The foods you eat help shape this process.  Seeing Food as Fuel  Eating the right foods helps build and repair cells to be stronger than before. It does this by getting nutrition from whole grains, vitamins, minerals, fats, protein and other nutrients.  These essential nutrients matter greatly to every single cell in your body. They make up your:  Cell membrane  Nucleus  Mitochondria  When cells join together, they make tissue that brings life to your bones, brain, skin, nerves, muscles, etc. The health and lifespan of your cells depend on the nutrients you get from food. That is why healthy food choices are so important.   Once you can start to see food as being fuel for your body, you will get better at making the healthy choice the easy choice. Food will be less about rewards or punishments and more about supporting your overall health and happiness.  Building Blocks of Good Nutrition  A diet without enough fiber, protein and healthy fats can cause your cells to become brittle, leaky and tired. When cells can't do what they are designed to do, problems like inflammation, cancer and other difficult health conditions start to arise.  Foods that Support Healthy Cells:  Unsaturated Fats - Fish, nuts  avocados, olive oil, flax seed, etc.  Protein - Poultry, lean beef, yogurt, eggs, seeds, beans, etc.  Antioxidants - Fruits, dark green veggies, sweet potatoes, tea, etc.  So, the next time you grab something to eat, ask yourself how that food helps or hurts your body. This is a part of living mindful and being knowledgeable about what you consume regularly.  When you start to see food as fuel, not just a tasty treat or the solution to a bad temper, you will start to make healthier choices more often. Making new habits is one of the building blocks to successful long-term weight management!  Clean Eating: What is it Really About?    March 18, 2022  Posted in Blog, Nutrition  By Your Weight Matters Campaign    Many people focus on “clean eating.” In a world filled with potato chips, fast food burgers and cake, switching to clean eating can be a big change. Is it time for you to make it?    What is Clean Eating?  Eating clean has many variations and has gained popularity over the last several years. It focuses on choosing whole foods and minimally-processed foods and is more of a philosophy than a diet. The goal is to choose foods as close to their natural state as possible. Adding fruits, vegetables, meats, and whole grains is a great start. Processed foods such as chips, cookies and fats are eliminated.    Unlike other plans, clean eating isn’t specifically about portion sizes or numbers. Some find this way of eating to be easier. There is limited research on eating clean; however, a clean diet is plant-based and low in saturated fat.    Tips for Eating Clean    Limit packaged processed foods. Some of this is obvious. It’s time to trade in the chips, cookies and snack cakes for other foods such as fruits, vegetables and nuts. Additionally, you might need to change how you prepare food. Swap boxed mashed potatoes for whole baked potatoes. Instead of bagged salad, chop your own salad from fresh vegetables.    Read the  labels. With the focus on minimally-processed foods, the fewer the ingredients the better. Avoid added sugar, sweeteners and preservatives.  Find other ways to spice up your food. Fresh herbs can go further than any added preservative. Basil, cilantro or chives can spice up any meal.  Choose whole grains. White bread and refined grains should be limited. Instead, choose whole-grain bread, quinoa, brown rice and oats.    Hydrate with water. Water is your main source of fluid. For some variety and extra flavor, add a slice of lime or cucumber into your water. Sodas and sugary drinks should be eliminated. Herbal teas are a great option.  Dive into dairy. Milk, unsweetened yogurt and cheese can be great choices. Try to avoid sweetened milks or yogurts.  Pack the protein. Protein from beans, nuts and legumes are great. So are lean meats without fatty flavorings. Some clean eaters avoid meat from certain grocery stores or brands due to growth hormones and antibiotics. For those, choosing organic may be an option.    Take Things Slow  It can be a little overwhelming to begin a clean eating plan. Throwing away the contents of your cabinet may not be an option. Instead of taking away, focus on adding. Add more fruit and more vegetables to your day. You will find that by doing this, there is less room for processed foods. Small changes over time can add up. Get started today!      What are proteins?  Proteins are one of three primary macronutrients that provide energy to the human body, along with fats and carbohydrates. Proteins are also responsible for a large portion of the work that is done in cells; they are necessary for proper structure and function of tissues and organs, and also act to regulate them. They are comprised of a number of amino acids that are essential to proper body function, and serve as the building blocks of body tissue.  There are 20 different amino acids in total, and the sequence of amino acids  determines a protein's structure and function. While some amino acids can be synthesized in the body, there are 9 amino acids that humans can only obtain from dietary sources (insufficient amounts of which may sometimes result in death), termed essential amino acids. Foods that provide all of the essential amino acids are called complete protein sources, and include both animal (meat, dairy, eggs, fish) as well as plant-based sources (soy, quinoa, buckwheat).  Proteins can be categorized based on the function they provide to the body. Below is a list of some types of proteins:  Antibody--proteins that protect the body from foreign particles, such as viruses and bacteria, by binding to them  Enzyme--proteins that help form new molecules as well as perform the many chemical reactions that occur throughout the body  Messenger--proteins that transmit signals throughout the body to maintain body processes  Structural component--proteins that act as building blocks for cells that ultimately allow the body to move  Transport/storage--proteins that move molecules throughout the body  As can be seen, proteins have many important roles throughout the body, and as such, it is important to provide sufficient nutrition to the body to maintain healthy protein levels.    How much protein do I need?  The amount of protein that the human body requires daily is dependent on many conditions, including overall energy intake, growth of the individual, and physical activity level. It is often estimated based on body weight, as a percentage of total caloric intake (10-35%), or based on age alone. 0.8g/kg of body weight is a commonly cited recommended dietary allowance (RDA). This value is the minimum recommended value to maintain basic nutritional requirements, but consuming more protein, up to a certain point, maybe beneficial, depending on the sources of the protein.  The recommended range of protein intake is between 0.8 g/kg and 1.8  g/kg of body weight, dependent on the many factors listed above. People who are highly active, or who wish to build more muscle should generally consume more protein. Some sources suggest consuming between 1.8 to 2 g/kg for those who are highly active. The amount of protein a person should consume, to date, is not an exact science, and each individual should consult a specialist, be it a dietitian, doctor, or , to help determine their individual needs. I recommend consuming a minimum of 120 grams of protein daily minimum.    Foods high in protein  There are many different combinations of food that a person can eat to meet their protein intake requirements. For many people, a large portion of protein intake comes from meat and dairy, though it is possible to get enough protein while meeting certain dietary restrictions you might have. Generally, it is easier to meet your RDA of protein by consuming meat and dairy, but an excess of either can have a negative health impact. There are plenty of plant-based protein options, but they generally contain less protein in a given serving. Ideally, a person should consume a mixture of meat, dairy, and plant-based foods in order to meet their RDA and have a balanced diet replete with nutrients.  If possible, consuming a variety of complete proteins is recommended. A complete protein is a protein that contains a good amount of each of the nine essential amino acids required in the human diet. Examples of complete protein foods or meals include:    Meat/Dairy examples  Eggs  Chicken breast  Cottage cheese  Greek yogurt  Milk  Lean beef  Tuna  Turkey breast  Fish  Shrimp    Vegan/plant-based examples  Buckwheat  Hummus and adina  Soy products (tofu, tempeh, edamame beans)  Peanut butter on toast or some other bread  Beans and rice  Quinoa  Hemp and naveen seeds  Spirulina  Generally, meat, poultry, fish, eggs, and dairy products are complete protein sources. Nuts and  seeds, legumes, grains, and vegetables, among other things, are usually incomplete proteins. There is nothing wrong with incomplete proteins however, and there are many healthy, high protein foods that are incomplete proteins. As long as you consume a sufficient variety of incomplete proteins to get all the required amino acids, it is not necessary to specifically eat complete protein foods. In fact, certain high fat red meats for example, a common source of complete proteins, can be unhealthy.   Below are some examples of high protein foods that are not complete proteins:  Almonds  Oats  Broccoli  Lentils  Julio bread  Johnny seeds  Pumpkin seeds  Peanuts  Maybeury sprouts  Grapefruit  Green peas  Avocados  Mushrooms  As can be seen, there are many different foods a person can consume to meet their RDA of protein. The examples provided above do not constitute an exhaustive list of high protein or complete protein foods. As with everything else, balance is important, and the examples provided above are an attempt at providing a list of healthier protein options (when consumed in moderation).    Amount of protein in common food      Protein Amount  Milk (1 cup/8 oz)  8 g  Egg (1 large/50 g)  6 g  Meat (1 slice / 2 oz)  14 g  Seafood (2 oz)  16 g  Bread (1 slice/64 g)   8 g  Corn (1 cup/166 g)  16 g  Rice (1 cup/195 g)  5 g  Dry Bean (1 cup/92 g)   16 g  Nuts (1 cup/92 g)    20 g  Fruits and Veggie (1 cup)  1 g    Data above taken from www.calculator.net    The Power of Protein:    High Protein Foods:  FISH  (3-6 ounces/meal)  All types of fish  Seafood (shrimp, scallops, clams, mussels, lobster)  EGGS     2-3 eggs/meal  DAIRY (2/3 to 1 ½ cup)  Cottage cheese   Greek yogurt  PLANTS (½-3/4 cup/meal)  Legumes: Dried beans and peas (black beans, joshi beans, garbanzo beans, kidney, cannellini, navy, split peas, black eyed peas)  Lentils  Quinoa  Soy (edamame, tofu)  PORK   (3-6 oz/meal)  Tenderloin  Pork chop  Top loin  roast, boneless  Sirloin roast, boneless  [place] forrester (nitrate free)  Boiled deli ham (nitrate free)    Additional Protein Sources:    BEEF    (3-6 oz/meal)  Flank steak       Skirt steak  Bottom round(rump roast), select   Ground beef, 90% lean (ground sirloin)  Elpidio eye steak, choice  Eye of round roast, choice   POULTRY  (3-6 oz/meal)  Ground chicken or turkey  Chicken, no skin  Turkey, no skin  PROTEIN SHAKES: OWYN, James, and Rebbl (plant based and dairy free), Corepower by Allostatixbilly (plant based option available), Premier Protein, JuicePlus Complete (www.UPEK.com)  PROTEIN BARS: RXBAR, PowerCrunch, Quest, Barebells, Mosh      Nutrition and MyPlate: Vegetables  Vegetables are a major source of fiber. They’re also packed with vitamins needed for health and growth. At mealtimes, make half your plate fruits and vegetables.  Nutrient-rich choices  Fresh, frozen, or canned--all vegetables are high in nutrients. The color of the skin tells you what’s inside. So if you eat plenty of colors, you get a variety of nutrients. Some good choices include:  Dark green vegetables, such as spinach, tammy greens, kale, and broccoli.  Bright red and orange vegetables, such as carrots, sweet potatoes, red bell peppers, and tomatoes.  Starchy vegetables, such as potatoes and squash.  What makes vegetables less healthy?  Boiling vegetables causes some vitamins to escape into the water. To hold on to vitamins, briefly steam, sauté, stir-hicks, or microwave instead. Overcooking destroys vitamins, so try to keep vegetables a little crispy.  Using a lot of margarine, butter, or salad dressing adds fat and calories, but not many nutrients. A small amount of these toppings is OK. But the more you add, the more fat you add, too.  Frozen vegetables that come with cheese sauce or other processed flavoring are high in fat and salt. It's healthier to season plain frozen vegetables yourself. Try fresh herbs, garlic, toasted  almonds, or sesame seeds.  Canned vegetables often have lots of salt. Shop for low-sodium varieties.  One small change  Sneak vegetables into every meal. Shred carrots into hamburger, or add zucchini to spaghetti and meatballs. You won't even notice! Have a better idea? Write it here:  ________________________________________________________  Date Last Reviewed: 10/1/2017  © 8051-2008 [employer]. [address] [place] [address], [place] [address]. All rights reserved. This information is not intended as a substitute for professional medical care. Always follow your healthcare professional's instructions.        Sleeping off the Pounds  by Marcia Bal MD  Summer 2009, [employer] www.obesityaction.org with modifications by ANTWAN Vu  While many Americans of all ages are striving to practice key factors to maintaining a healthy lifestyle such as eating balanced meals and maintaining regular physical activity, we may be forgetting one of the most important, most natural methods of weight-loss and maintenance - getting a good night’s sleep.  Over many years, several large-scale studies have noted the association between lack of sleep and overweight/obesity.    How Does Sleep Affect Weight?  A study published by [facility] professors demonstrated that subjects sleeping five hours per night were about 73 percent more likely to become affected by obesity than those sleeping seven to nine hours per night. While this is astounding, even increasing to six hours per night still makes an individual 27 percent more likely to become affected by obesity than a neighbor who sleeps seven to nine hours. So calm that barking dog!  Interestingly, this study discovered that subjects with severe obesity (body mass index above 60) tend to have reduced periods of sleep. This may be due to sleep apnea - characterized by loud snoring and episodes of cessation of breathing during sleep that make one more  likely to fall asleep during the day.  Furthermore, a study in the [place] confirmed a similar relationship between sleep and obesity in both children and adults. So, we should be sure to instill good sleeping habits as a priority in the youth of [place].  Recommendations for Sleep  The current recommendations for sleep are:  Infants (3-11 months old) : 14 to 15 hours  Toddlers: 12 to 14 hours  Pre-school Child: 11 to 13 hours  School-aged Children: 10 to 11 hours  Adolescents: 9 hours  Adults: 7 to 9 hours    Hormones and Your Weight  While these studies cannot be used to imply a cause and effect relationship, the correlation cannot be ignored. Scientists have devised a theory to explain why lack of sleep is associated with higher weight.  Two hormones, ghrelin and leptin, play important roles in controlling hunger and they are activated based on how much a person sleeps. Simply put, leptin decreases the sensation of hunger and ghrelin stimulates hunger.  While we sleep, our bodies use the down time to process the amount of fat stores present. If we have an adequate amount of sleep time, excess fat stores can be recognized and thus, the hormone leptin is activated to tell us to eat less the following day. However, when a person is sleep deprived, the levels of leptin decrease and ghrelin, a hunger hormone, is activated and we are stimulated to eat more the next day. This phenomenon may sound familiar if you noticed a craving for high-calorie, starchy foods the day after working or studying late. Several years of sleep deprivation can really cause you to pack on the pounds.  Your mother was right! Hitting the sack is not only useful for staying awake the next day but also giving your body the rest it needs to maintain a hormone balance that will help you eat less and have more energy to sustain exercise.  If you have trouble sleeping or maintaining sleep after adopting the habits of sleep hygiene, be sure to  discuss your symptoms with your medical provider as proper diagnosis and treatment of sleep disorders is essential to maintaining a healthy weight as well as general well-being.  Common Sleep Disorders  Insomnia - difficulty falling asleep or maintaining sleep.  Sleep apnea - loud snoring and interruptions in breathing when sleeping. There are varying degrees of sleep apnea and a sleep study can determine if this \"silent\" disease is present and to what degree. A apnea-hypoapnea index (AHI) score determines the degree of severity. An AHI score <5 is considered normal, 5-14 is mild, 15-29 is moderate and >30 is severe.  Shift work sleep disorder - inability to fall asleep or excessive sleepiness in a person who works evening shifts or alternating shifts.    Helpful Tips to Catch Some Zzz’s  For those of you who toss and turn all night, meeting these goals can be challenging. So, just for you, a medical specialty called sleep medicine was derived. Sleep specialists have developed a set of proven rules called “sleep hygiene” that will increase your likelihood of getting your “zzz’s.”  Avoid napping during the day.  Maintain a regular bedtime routine and try to stick with the same sleeping and waking times seven days a week.  Avoid caffeine, nicotine, alcohol and stimulant medications close to bedtime.  Reserve vigorous exercise for morning or late afternoon. Opt for more relaxing exercises at night.  Avoid large meals at bedtime. Keep evening snacks in the 100-200 calorie range.  Elderly people and night-shift workers should be sure to get good light exposure during “daytime” from indoor lighting, while closing blinds, etc. 1-2 hours before bedtime to help set the Circadian rhythm.  Use the bed only for sleeping and sex. Try not to read, eat or watch television in bed.  Keep your sleep environment comfortable, pleasant and climate-controlled.    …Happy Dreams!        Medication use and SEs reviewed with  patient.    Return in about 4 months (around 6/13/2025) for weight management via clinic or Telemedicine Visit and in clinic in September.    Patient verbalizes understanding.    DOCUMENTATION OF TIME SPENT: Code selection for this visit was based on time spent : 30 minutes on date of service in preparing to see the patient, obtaining and/or reviewing separately obtained history, performing a medically appropriate examination, counseling and educating the patient/family/caregiver, ordering medications or testing, referring and communicating with other healthcare providers, documenting clinical information in the electronic medical record, independently interpreting results and communicating results to the patient/family/caregiver and care coordination with the patient's other providers.      Answers submitted by the patient for this visit:  Medical Weight Loss Follow Up (Submitted on 2/12/2025)  If greater than 5/1O how would you grade your coping mechanisms?: fair

## 2025-02-14 NOTE — PATIENT INSTRUCTIONS
Continue making lifestyle changes that focus on good nutrition, regular exercise and stress management.    Medication Plan: Finish up Zepbound 5 mg weekly pens and then increase to 7.5 mg weekly x4 weeks and then increase to 10 mg weekly.    Tips while taking an injectable medication:    Be an intuitive eater. Listen to your hunger and fullness signals, stopping when you are full.  Consume protein and produce in your day, striving for a rainbow of color of produce.  Reduce portions to staring size of 1 cup size and check in with your gut to see if you are full. Set the timer to slow down your eating pace to allow for 15-20 minutes to complete a meal. Recommend following the \"2 bite rule\".  Reduce refined sugars and high fat foods, as they may contribute to greater side effects of nausea and heartburn.  Stop eating 3 hours before bedtime to allow your food to digest.  Remain hydrated with water or non caloric and non caffeine beverages.  Use over the counter raphael lozenge/supplement to help reduce nausea if needed.  If you have been off your medication for more than 2 weeks please notify our office to determine next dosing, as return to previous dose may not be appropriate or tolerated.    Next steps to work on before next office visit include: Recommend repeat home sleep study to assess current state of sleep apnea. Recommend dietician consult with support balanced nutrition. Some tips are listed below encouraging a reduction in processed foods.    I also recommend modifying nutrition with a focus on Food 4 Fuel and eating clean, lean and green!     Eat whole foods (think farm to table sources) and minimize/eliminate processed and ultra processed foods.  Eat lean sources of protein, recommending incorporating plant based options (no fat/cholesterol in plants) and focus on lean animal protein sources such as eggs, chicken and fish. Minimize beef such as pork and red meat to 1 serving/week.  Increase the consumption  of plant based foods with a goal of making 85% of your daily nutrition from plants. Plant based foods are less calorically dense and more nutritionally dense. They do not have fat, which can contribute to insulin resistance and elevated cholesterol. Fruits and vegetables provide an array of vitamins, minerals, phytonutrients (plant protectors) and antiinflammatory properties. All these components help to prevent disease and help your body to work properly!      Re-thinking Nutrition: Learning to See Food as Fuel  October 8, 2019  Posted in Blog, Nutrition  By [employer] [facility]    “Dieting” can start to feel challenging when we begin to associate healthy behaviors with “punishment.” Too often, we overlook the real reason we eat food. It's not only meant to be enjoyed, but also to provide basic fuel for our bodies.  Learning to see food as fuel can help you find balance in your journey with weight. It will teach you about the primal purpose of food, the effect certain foods have on health, and how to listen carefully to what your body is trying to tell you.  It Starts with Cells  Did you know your body has more than 35 trillion cells? Each one serves a purpose.  Once a cell has completed its purpose, it dies. Your body replaces dead and worn-out cells with new and energetic ones. It also depends on this ongoing cell cycle to keep you healthy. And guess what? The foods you eat help shape this process.  Seeing Food as Fuel  Eating the right foods helps build and repair cells to be stronger than before. It does this by getting nutrition from whole grains, vitamins, minerals, fats, protein and other nutrients.  These essential nutrients matter greatly to every single cell in your body. They make up your:  Cell membrane  Nucleus  Mitochondria  When cells join together, they make tissue that brings life to your bones, brain, skin, nerves, muscles, etc. The health and lifespan of your cells depend on the nutrients  you get from food. That is why healthy food choices are so important.   Once you can start to see food as being fuel for your body, you will get better at making the healthy choice the easy choice. Food will be less about rewards or punishments and more about supporting your overall health and happiness.  Building Blocks of Good Nutrition  A diet without enough fiber, protein and healthy fats can cause your cells to become brittle, leaky and tired. When cells can't do what they are designed to do, problems like inflammation, cancer and other difficult health conditions start to arise.  Foods that Support Healthy Cells:  Unsaturated Fats - Fish, nuts avocados, olive oil, flax seed, etc.  Protein - Poultry, lean beef, yogurt, eggs, seeds, beans, etc.  Antioxidants - Fruits, dark green veggies, sweet potatoes, tea, etc.  So, the next time you grab something to eat, ask yourself how that food helps or hurts your body. This is a part of living mindful and being knowledgeable about what you consume regularly.  When you start to see food as fuel, not just a tasty treat or the solution to a bad temper, you will start to make healthier choices more often. Making new habits is one of the building blocks to successful long-term weight management!  Clean Eating: What is it Really About?    March 18, 2022  Posted in Blog, Nutrition  [employer] [facility]    Many people focus on “clean eating.” In a world filled with potato chips, fast food burgers and cake, switching to clean eating can be a big change. Is it time for you to make it?    What is Clean Eating?  Eating clean has many variations and has gained popularity over the last several years. It focuses on choosing whole foods and minimally-processed foods and is more of a philosophy than a diet. The goal is to choose foods as close to their natural state as possible. Adding fruits, vegetables, meats, and whole grains is a great start. Processed foods such as chips,  cookies and fats are eliminated.    Unlike other plans, clean eating isn’t specifically about portion sizes or numbers. Some find this way of eating to be easier. There is limited research on eating clean; however, a clean diet is plant-based and low in saturated fat.    Tips for Eating Clean    Limit packaged processed foods. Some of this is obvious. It’s time to trade in the chips, cookies and snack cakes for other foods such as fruits, vegetables and nuts. Additionally, you might need to change how you prepare food. Swap boxed mashed potatoes for whole baked potatoes. Instead of bagged salad, chop your own salad from fresh vegetables.    Read the labels. With the focus on minimally-processed foods, the fewer the ingredients the better. Avoid added sugar, sweeteners and preservatives.  Find other ways to spice up your food. Fresh herbs can go further than any added preservative. Basil, cilantro or chives can spice up any meal.  Choose whole grains. White bread and refined grains should be limited. Instead, choose whole-grain bread, quinoa, brown rice and oats.    Hydrate with water. Water is your main source of fluid. For some variety and extra flavor, add a slice of lime or cucumber into your water. Sodas and sugary drinks should be eliminated. Herbal teas are a great option.  Dive into dairy. Milk, unsweetened yogurt and cheese can be great choices. Try to avoid sweetened milks or yogurts.  Pack the protein. Protein from beans, nuts and legumes are great. So are lean meats without fatty flavorings. Some clean eaters avoid meat from certain grocery stores or brands due to growth hormones and antibiotics. For those, choosing organic may be an option.    Take Things Slow  It can be a little overwhelming to begin a clean eating plan. Throwing away the contents of your cabinet may not be an option. Instead of taking away, focus on adding. Add more fruit and more vegetables to your day. You will find that by doing  this, there is less room for processed foods. Small changes over time can add up. Get started today!      What are proteins?  Proteins are one of three primary macronutrients that provide energy to the human body, along with fats and carbohydrates. Proteins are also responsible for a large portion of the work that is done in cells; they are necessary for proper structure and function of tissues and organs, and also act to regulate them. They are comprised of a number of amino acids that are essential to proper body function, and serve as the building blocks of body tissue.  There are 20 different amino acids in total, and the sequence of amino acids determines a protein's structure and function. While some amino acids can be synthesized in the body, there are 9 amino acids that humans can only obtain from dietary sources (insufficient amounts of which may sometimes result in death), termed essential amino acids. Foods that provide all of the essential amino acids are called complete protein sources, and include both animal (meat, dairy, eggs, fish) as well as plant-based sources (soy, quinoa, buckwheat).  Proteins can be categorized based on the function they provide to the body. Below is a list of some types of proteins:  Antibody--proteins that protect the body from foreign particles, such as viruses and bacteria, by binding to them  Enzyme--proteins that help form new molecules as well as perform the many chemical reactions that occur throughout the body  Messenger--proteins that transmit signals throughout the body to maintain body processes  Structural component--proteins that act as building blocks for cells that ultimately allow the body to move  Transport/storage--proteins that move molecules throughout the body  As can be seen, proteins have many important roles throughout the body, and as such, it is important to provide sufficient nutrition to the body to maintain healthy protein levels.    How much  protein do I need?  The amount of protein that the human body requires daily is dependent on many conditions, including overall energy intake, growth of the individual, and physical activity level. It is often estimated based on body weight, as a percentage of total caloric intake (10-35%), or based on age alone. 0.8g/kg of body weight is a commonly cited recommended dietary allowance (RDA). This value is the minimum recommended value to maintain basic nutritional requirements, but consuming more protein, up to a certain point, maybe beneficial, depending on the sources of the protein.  The recommended range of protein intake is between 0.8 g/kg and 1.8 g/kg of body weight, dependent on the many factors listed above. People who are highly active, or who wish to build more muscle should generally consume more protein. Some sources suggest consuming between 1.8 to 2 g/kg for those who are highly active. The amount of protein a person should consume, to date, is not an exact science, and each individual should consult a specialist, be it a dietitian, doctor, or , to help determine their individual needs. I recommend consuming a minimum of 120 grams of protein daily minimum.    Foods high in protein  There are many different combinations of food that a person can eat to meet their protein intake requirements. For many people, a large portion of protein intake comes from meat and dairy, though it is possible to get enough protein while meeting certain dietary restrictions you might have. Generally, it is easier to meet your RDA of protein by consuming meat and dairy, but an excess of either can have a negative health impact. There are plenty of plant-based protein options, but they generally contain less protein in a given serving. Ideally, a person should consume a mixture of meat, dairy, and plant-based foods in order to meet their RDA and have a balanced diet replete with nutrients.  If possible,  consuming a variety of complete proteins is recommended. A complete protein is a protein that contains a good amount of each of the nine essential amino acids required in the human diet. Examples of complete protein foods or meals include:    Meat/Dairy examples  Eggs  Chicken breast  Cottage cheese  Greek yogurt  Milk  Lean beef  Tuna  Turkey breast  Fish  Shrimp    Vegan/plant-based examples  Buckwheat  Hummus and adina  Soy products (tofu, tempeh, edamame beans)  Peanut butter on toast or some other bread  Beans and rice  Quinoa  Hemp and johnny seeds  Spirulina  Generally, meat, poultry, fish, eggs, and dairy products are complete protein sources. Nuts and seeds, legumes, grains, and vegetables, among other things, are usually incomplete proteins. There is nothing wrong with incomplete proteins however, and there are many healthy, high protein foods that are incomplete proteins. As long as you consume a sufficient variety of incomplete proteins to get all the required amino acids, it is not necessary to specifically eat complete protein foods. In fact, certain high fat red meats for example, a common source of complete proteins, can be unhealthy.   Below are some examples of high protein foods that are not complete proteins:  Almonds  Oats  Broccoli  Lentils  Julio bread  Johnny seeds  Pumpkin seeds  Peanuts  Coldiron sprouts  Grapefruit  Green peas  Avocados  Mushrooms  As can be seen, there are many different foods a person can consume to meet their RDA of protein. The examples provided above do not constitute an exhaustive list of high protein or complete protein foods. As with everything else, balance is important, and the examples provided above are an attempt at providing a list of healthier protein options (when consumed in moderation).    Amount of protein in common food      Protein Amount  Milk (1 cup/8 oz)  8 g  Egg (1 large/50 g)  6 g  Meat (1 slice / 2 oz)  14 g  Seafood (2 oz)  16 g  Bread (1 slice/64  g)   8 g  Corn (1 cup/166 g)  16 g  Rice (1 cup/195 g)  5 g  Dry Bean (1 cup/92 g)   16 g  Nuts (1 cup/92 g)    20 g  Fruits and Veggie (1 cup)  1 g    Data above taken from www.calculator.net    The Power of Protein:    High Protein Foods:  FISH  (3-6 ounces/meal)  All types of fish  Seafood (shrimp, scallops, clams, mussels, lobster)  EGGS     2-3 eggs/meal  DAIRY (2/3 to 1 ½ cup)  Cottage cheese   Greek yogurt  PLANTS (½-3/4 cup/meal)  Legumes: Dried beans and peas (black beans, joshi beans, garbanzo beans, kidney, cannellini, navy, split peas, black eyed peas)  Lentils  Quinoa  Soy (edamame, tofu)  PORK   (3-6 oz/meal)  Tenderloin  Pork chop  Top loin roast, boneless  Sirloin roast, boneless  Argentine forrester (nitrate free)  Boiled deli ham (nitrate free)    Additional Protein Sources:    BEEF    (3-6 oz/meal)  Flank steak       Skirt steak  Bottom round(rump roast), select   Ground beef, 90% lean (ground sirloin)  Elpidio eye steak, choice  Eye of round roast, choice   POULTRY  (3-6 oz/meal)  Ground chicken or turkey  Chicken, no skin  Turkey, no skin  PROTEIN SHAKES: OWYN, Koia, and Rebbl (plant based and dairy free), Corepower by [employer], Orgain (plant based option available), Premier Protein, JuicePlus Complete (www.juiceplus.com)  PROTEIN BARS: RXBAR, PowerCrunch, Quest, Barebells, Mosh      Nutrition and MyPlate: Vegetables  Vegetables are a major source of fiber. They’re also packed with vitamins needed for health and growth. At mealtimes, make half your plate fruits and vegetables.  Nutrient-rich choices  Fresh, frozen, or canned--all vegetables are high in nutrients. The color of the skin tells you what’s inside. So if you eat plenty of colors, you get a variety of nutrients. Some good choices include:  Dark green vegetables, such as spinach, tammy greens, kale, and broccoli.  Bright red and orange vegetables, such as carrots, sweet potatoes, red bell peppers, and tomatoes.  Starchy vegetables, such as  potatoes and squash.  What makes vegetables less healthy?  Boiling vegetables causes some vitamins to escape into the water. To hold on to vitamins, briefly steam, sauté, stir-hicks, or microwave instead. Overcooking destroys vitamins, so try to keep vegetables a little crispy.  Using a lot of margarine, butter, or salad dressing adds fat and calories, but not many nutrients. A small amount of these toppings is OK. But the more you add, the more fat you add, too.  Frozen vegetables that come with cheese sauce or other processed flavoring are high in fat and salt. It's healthier to season plain frozen vegetables yourself. Try fresh herbs, garlic, toasted almonds, or sesame seeds.  Canned vegetables often have lots of salt. Shop for low-sodium varieties.  One small change  Sneak vegetables into every meal. Shred carrots into hamburger, or add zucchini to spaghetti and meatballs. You won't even notice! Have a better idea? Write it here:  ________________________________________________________  Date Last Reviewed: 10/1/2017  © 4491-6293 [employer]. [address] [place] [address]. All rights reserved. This information is not intended as a substitute for professional medical care. Always follow your healthcare professional's instructions.        Sleeping off the Pounds  by Marcia Bal MD  Summer 2009, [employer] www.obesityaction.org with modifications by ANTWAN Vu  While many Americans of all ages are striving to practice key factors to maintaining a healthy lifestyle such as eating balanced meals and maintaining regular physical activity, we may be forgetting one of the most important, most natural methods of weight-loss and maintenance - getting a good night’s sleep.  Over many years, several large-scale studies have noted the association between lack of sleep and overweight/obesity.    How Does Sleep Affect Weight?  A study published by [facility] marko  demonstrated that subjects sleeping five hours per night were about 73 percent more likely to become affected by obesity than those sleeping seven to nine hours per night. While this is astounding, even increasing to six hours per night still makes an individual 27 percent more likely to become affected by obesity than a neighbor who sleeps seven to nine hours. So calm that barking dog!  Interestingly, this study discovered that subjects with severe obesity (body mass index above 60) tend to have reduced periods of sleep. This may be due to sleep apnea - characterized by loud snoring and episodes of cessation of breathing during sleep that make one more likely to fall asleep during the day.  Furthermore, a study in the [place] confirmed a similar relationship between sleep and obesity in both children and adults. So, we should be sure to instill good sleeping habits as a priority in the youth of [place].  Recommendations for Sleep  The current recommendations for sleep are:  Infants (3-11 months old) : 14 to 15 hours  Toddlers: 12 to 14 hours  Pre-school Child: 11 to 13 hours  School-aged Children: 10 to 11 hours  Adolescents: 9 hours  Adults: 7 to 9 hours    Hormones and Your Weight  While these studies cannot be used to imply a cause and effect relationship, the correlation cannot be ignored. Scientists have devised a theory to explain why lack of sleep is associated with higher weight.  Two hormones, ghrelin and leptin, play important roles in controlling hunger and they are activated based on how much a person sleeps. Simply put, leptin decreases the sensation of hunger and ghrelin stimulates hunger.  While we sleep, our bodies use the down time to process the amount of fat stores present. If we have an adequate amount of sleep time, excess fat stores can be recognized and thus, the hormone leptin is activated to tell us to eat less the following day. However, when a person is sleep deprived, the levels  of leptin decrease and ghrelin, a hunger hormone, is activated and we are stimulated to eat more the next day. This phenomenon may sound familiar if you noticed a craving for high-calorie, starchy foods the day after working or studying late. Several years of sleep deprivation can really cause you to pack on the pounds.  Your mother was right! Hitting the sack is not only useful for staying awake the next day but also giving your body the rest it needs to maintain a hormone balance that will help you eat less and have more energy to sustain exercise.  If you have trouble sleeping or maintaining sleep after adopting the habits of sleep hygiene, be sure to discuss your symptoms with your medical provider as proper diagnosis and treatment of sleep disorders is essential to maintaining a healthy weight as well as general well-being.  Common Sleep Disorders  Insomnia - difficulty falling asleep or maintaining sleep.  Sleep apnea - loud snoring and interruptions in breathing when sleeping. There are varying degrees of sleep apnea and a sleep study can determine if this \"silent\" disease is present and to what degree. A apnea-hypoapnea index (AHI) score determines the degree of severity. An AHI score <5 is considered normal, 5-14 is mild, 15-29 is moderate and >30 is severe.  Shift work sleep disorder - inability to fall asleep or excessive sleepiness in a person who works evening shifts or alternating shifts.    Helpful Tips to Catch Some Zzz’s  For those of you who toss and turn all night, meeting these goals can be challenging. So, just for you, a medical specialty called sleep medicine was derived. Sleep specialists have developed a set of proven rules called “sleep hygiene” that will increase your likelihood of getting your “zzz’s.”  Avoid napping during the day.  Maintain a regular bedtime routine and try to stick with the same sleeping and waking times seven days a week.  Avoid caffeine, nicotine, alcohol and  stimulant medications close to bedtime.  Reserve vigorous exercise for morning or late afternoon. Opt for more relaxing exercises at night.  Avoid large meals at bedtime. Keep evening snacks in the 100-200 calorie range.  Elderly people and night-shift workers should be sure to get good light exposure during “daytime” from indoor lighting, while closing blinds, etc. 1-2 hours before bedtime to help set the Circadian rhythm.  Use the bed only for sleeping and sex. Try not to read, eat or watch television in bed.  Keep your sleep environment comfortable, pleasant and climate-controlled.    …Happy Dreams!

## 2025-02-23 PROBLEM — E66.813 CLASS 3 SEVERE OBESITY WITHOUT SERIOUS COMORBIDITY WITH BODY MASS INDEX (BMI) OF 45.0 TO 49.9 IN ADULT (HCC): Status: ACTIVE | Noted: 2025-02-23

## 2025-02-23 PROBLEM — E66.813 CLASS 3 SEVERE OBESITY WITHOUT SERIOUS COMORBIDITY WITH BODY MASS INDEX (BMI) OF 45.0 TO 49.9 IN ADULT: Status: ACTIVE | Noted: 2025-02-23

## 2025-02-23 PROBLEM — E66.01 CLASS 3 SEVERE OBESITY WITHOUT SERIOUS COMORBIDITY WITH BODY MASS INDEX (BMI) OF 45.0 TO 49.9 IN ADULT (HCC): Status: ACTIVE | Noted: 2025-02-23

## 2025-03-17 DIAGNOSIS — E66.01 CLASS 3 SEVERE OBESITY WITH SERIOUS COMORBIDITY AND BODY MASS INDEX (BMI) OF 45.0 TO 49.9 IN ADULT, UNSPECIFIED OBESITY TYPE (HCC): ICD-10-CM

## 2025-03-17 DIAGNOSIS — Z51.81 ENCOUNTER FOR THERAPEUTIC DRUG MONITORING: ICD-10-CM

## 2025-03-17 DIAGNOSIS — E66.813 CLASS 3 SEVERE OBESITY WITH SERIOUS COMORBIDITY AND BODY MASS INDEX (BMI) OF 45.0 TO 49.9 IN ADULT, UNSPECIFIED OBESITY TYPE (HCC): ICD-10-CM

## 2025-03-17 DIAGNOSIS — G47.33 OSA (OBSTRUCTIVE SLEEP APNEA): ICD-10-CM

## 2025-03-17 RX ORDER — TIRZEPATIDE 7.5 MG/.5ML
7.5 INJECTION, SOLUTION SUBCUTANEOUS WEEKLY
Qty: 2 ML | Refills: 0 | OUTPATIENT
Start: 2025-03-17

## 2025-04-06 PROBLEM — F41.8 OTHER SPECIFIED ANXIETY DISORDERS: Status: ACTIVE | Noted: 2025-04-06

## 2025-04-15 ENCOUNTER — TELEPHONE (OUTPATIENT)
Dept: FAMILY MEDICINE CLINIC | Facility: CLINIC | Age: 50
End: 2025-04-15

## 2025-04-15 DIAGNOSIS — Z12.11 COLON CANCER SCREENING: Primary | ICD-10-CM

## 2025-04-15 NOTE — TELEPHONE ENCOUNTER
Patient is requesting a referral for colon cancer screening, last one given was 2024 and has , please advise

## 2025-05-11 DIAGNOSIS — E55.9 VITAMIN D DEFICIENCY: ICD-10-CM

## 2025-05-12 RX ORDER — ERGOCALCIFEROL 1.25 MG/1
50000 CAPSULE, LIQUID FILLED ORAL WEEKLY
Qty: 12 CAPSULE | Refills: 1 | OUTPATIENT
Start: 2025-05-12

## 2025-05-15 ENCOUNTER — OFFICE VISIT (OUTPATIENT)
Dept: INTERNAL MEDICINE CLINIC | Facility: CLINIC | Age: 50
End: 2025-05-15
Payer: COMMERCIAL

## 2025-05-15 VITALS — WEIGHT: 258 LBS | BODY MASS INDEX: 44 KG/M2

## 2025-05-15 DIAGNOSIS — E66.813 OBESITY, CLASS III, BMI 40-49.9 (MORBID OBESITY): ICD-10-CM

## 2025-05-15 DIAGNOSIS — R73.03 PREDIABETES: ICD-10-CM

## 2025-05-15 DIAGNOSIS — G47.33 OSA (OBSTRUCTIVE SLEEP APNEA): ICD-10-CM

## 2025-05-15 DIAGNOSIS — R73.01 IFG (IMPAIRED FASTING GLUCOSE): ICD-10-CM

## 2025-05-15 DIAGNOSIS — Z82.49 FAMILY HISTORY OF HEART DISEASE: ICD-10-CM

## 2025-05-15 DIAGNOSIS — E88.819 INSULIN RESISTANCE: ICD-10-CM

## 2025-05-15 DIAGNOSIS — E66.813 CLASS 3 SEVERE OBESITY WITH SERIOUS COMORBIDITY AND BODY MASS INDEX (BMI) OF 45.0 TO 49.9 IN ADULT, UNSPECIFIED OBESITY TYPE: ICD-10-CM

## 2025-05-15 DIAGNOSIS — K21.9 GASTROESOPHAGEAL REFLUX DISEASE, UNSPECIFIED WHETHER ESOPHAGITIS PRESENT: ICD-10-CM

## 2025-05-15 PROCEDURE — 97802 MEDICAL NUTRITION INDIV IN: CPT | Performed by: DIETITIAN, REGISTERED

## 2025-06-18 ENCOUNTER — OFFICE VISIT (OUTPATIENT)
Dept: FAMILY MEDICINE CLINIC | Facility: CLINIC | Age: 50
End: 2025-06-18
Payer: COMMERCIAL

## 2025-06-18 ENCOUNTER — LAB ENCOUNTER (OUTPATIENT)
Dept: LAB | Age: 50
End: 2025-06-18
Attending: FAMILY MEDICINE
Payer: COMMERCIAL

## 2025-06-18 VITALS
OXYGEN SATURATION: 98 % | HEART RATE: 90 BPM | WEIGHT: 248 LBS | DIASTOLIC BLOOD PRESSURE: 80 MMHG | HEIGHT: 64 IN | BODY MASS INDEX: 42.34 KG/M2 | SYSTOLIC BLOOD PRESSURE: 120 MMHG

## 2025-06-18 DIAGNOSIS — Z00.00 WELLNESS EXAMINATION: Primary | ICD-10-CM

## 2025-06-18 DIAGNOSIS — F43.21 ADJUSTMENT DISORDER WITH DEPRESSED MOOD: ICD-10-CM

## 2025-06-18 DIAGNOSIS — R73.03 PREDIABETES: ICD-10-CM

## 2025-06-18 DIAGNOSIS — R33.9 URINARY RETENTION: ICD-10-CM

## 2025-06-18 DIAGNOSIS — L72.9 CYST OF SKIN: ICD-10-CM

## 2025-06-18 DIAGNOSIS — Z00.00 LABORATORY EXAM ORDERED AS PART OF ROUTINE GENERAL MEDICAL EXAMINATION: ICD-10-CM

## 2025-06-18 DIAGNOSIS — J45.990 EXERCISE-INDUCED ASTHMA (HCC): ICD-10-CM

## 2025-06-18 DIAGNOSIS — E88.819 INSULIN RESISTANCE: ICD-10-CM

## 2025-06-18 DIAGNOSIS — Z12.31 SCREENING MAMMOGRAM FOR BREAST CANCER: ICD-10-CM

## 2025-06-18 DIAGNOSIS — E55.9 VITAMIN D DEFICIENCY: ICD-10-CM

## 2025-06-18 DIAGNOSIS — E66.813 CLASS 3 SEVERE OBESITY DUE TO EXCESS CALORIES WITH SERIOUS COMORBIDITY AND BODY MASS INDEX (BMI) OF 40.0 TO 44.9 IN ADULT: ICD-10-CM

## 2025-06-18 DIAGNOSIS — K21.9 GASTROESOPHAGEAL REFLUX DISEASE, UNSPECIFIED WHETHER ESOPHAGITIS PRESENT: ICD-10-CM

## 2025-06-18 DIAGNOSIS — R07.89 ATYPICAL CHEST PAIN: ICD-10-CM

## 2025-06-18 DIAGNOSIS — E03.9 HYPOTHYROIDISM, UNSPECIFIED TYPE: ICD-10-CM

## 2025-06-18 DIAGNOSIS — E78.2 MIXED HYPERLIPIDEMIA: ICD-10-CM

## 2025-06-18 LAB
ALBUMIN SERPL-MCNC: 4.7 G/DL (ref 3.2–4.8)
ALBUMIN/GLOB SERPL: 2.1 {RATIO} (ref 1–2)
ALP LIVER SERPL-CCNC: 60 U/L (ref 41–108)
ALT SERPL-CCNC: 19 U/L (ref 10–49)
ANION GAP SERPL CALC-SCNC: 11 MMOL/L (ref 0–18)
AST SERPL-CCNC: 19 U/L (ref ?–34)
BASOPHILS # BLD AUTO: 0.02 X10(3) UL (ref 0–0.2)
BASOPHILS NFR BLD AUTO: 0.4 %
BILIRUB SERPL-MCNC: 0.4 MG/DL (ref 0.3–1.2)
BILIRUB UR QL STRIP.AUTO: NEGATIVE
BUN BLD-MCNC: 10 MG/DL (ref 9–23)
CALCIUM BLD-MCNC: 9.4 MG/DL (ref 8.7–10.6)
CHLORIDE SERPL-SCNC: 105 MMOL/L (ref 98–112)
CHOLEST SERPL-MCNC: 219 MG/DL (ref ?–200)
CLARITY UR REFRACT.AUTO: CLEAR
CO2 SERPL-SCNC: 26 MMOL/L (ref 21–32)
CREAT BLD-MCNC: 0.92 MG/DL (ref 0.55–1.02)
EGFRCR SERPLBLD CKD-EPI 2021: 75 ML/MIN/1.73M2 (ref 60–?)
EOSINOPHIL # BLD AUTO: 0.12 X10(3) UL (ref 0–0.7)
EOSINOPHIL NFR BLD AUTO: 2.3 %
ERYTHROCYTE [DISTWIDTH] IN BLOOD BY AUTOMATED COUNT: 13.8 %
EST. AVERAGE GLUCOSE BLD GHB EST-MCNC: 111 MG/DL (ref 68–126)
FASTING PATIENT LIPID ANSWER: YES
FASTING STATUS PATIENT QL REPORTED: YES
GLOBULIN PLAS-MCNC: 2.2 G/DL (ref 2–3.5)
GLUCOSE BLD-MCNC: 94 MG/DL (ref 70–99)
GLUCOSE UR STRIP.AUTO-MCNC: NORMAL MG/DL
HBA1C MFR BLD: 5.5 % (ref ?–5.7)
HCT VFR BLD AUTO: 44.6 % (ref 35–48)
HDLC SERPL-MCNC: 43 MG/DL (ref 40–59)
HGB BLD-MCNC: 14.4 G/DL (ref 12–16)
IMM GRANULOCYTES # BLD AUTO: 0.02 X10(3) UL (ref 0–1)
IMM GRANULOCYTES NFR BLD: 0.4 %
KETONES UR STRIP.AUTO-MCNC: NEGATIVE MG/DL
LDLC SERPL CALC-MCNC: 142 MG/DL (ref ?–100)
LEUKOCYTE ESTERASE UR QL STRIP.AUTO: NEGATIVE
LYMPHOCYTES # BLD AUTO: 1.41 X10(3) UL (ref 1–4)
LYMPHOCYTES NFR BLD AUTO: 27.3 %
MCH RBC QN AUTO: 30.7 PG (ref 26–34)
MCHC RBC AUTO-ENTMCNC: 32.3 G/DL (ref 31–37)
MCV RBC AUTO: 95.1 FL (ref 80–100)
MONOCYTES # BLD AUTO: 0.31 X10(3) UL (ref 0.1–1)
MONOCYTES NFR BLD AUTO: 6 %
NEUTROPHILS # BLD AUTO: 3.29 X10 (3) UL (ref 1.5–7.7)
NEUTROPHILS # BLD AUTO: 3.29 X10(3) UL (ref 1.5–7.7)
NEUTROPHILS NFR BLD AUTO: 63.6 %
NITRITE UR QL STRIP.AUTO: NEGATIVE
NONHDLC SERPL-MCNC: 176 MG/DL (ref ?–130)
OSMOLALITY SERPL CALC.SUM OF ELEC: 293 MOSM/KG (ref 275–295)
PH UR STRIP.AUTO: 5.5 [PH] (ref 5–8)
PLATELET # BLD AUTO: 285 10(3)UL (ref 150–450)
POTASSIUM SERPL-SCNC: 4.8 MMOL/L (ref 3.5–5.1)
PROT SERPL-MCNC: 6.9 G/DL (ref 5.7–8.2)
PROT UR STRIP.AUTO-MCNC: NEGATIVE MG/DL
RBC # BLD AUTO: 4.69 X10(6)UL (ref 3.8–5.3)
RBC UR QL AUTO: NEGATIVE
SODIUM SERPL-SCNC: 142 MMOL/L (ref 136–145)
SP GR UR STRIP.AUTO: 1.01 (ref 1–1.03)
T4 FREE SERPL-MCNC: 1.4 NG/DL (ref 0.8–1.7)
TRIGL SERPL-MCNC: 189 MG/DL (ref 30–149)
TSI SER-ACNC: 4.15 UIU/ML (ref 0.55–4.78)
UROBILINOGEN UR STRIP.AUTO-MCNC: NORMAL MG/DL
VIT D+METAB SERPL-MCNC: 50.2 NG/ML (ref 30–100)
VLDLC SERPL CALC-MCNC: 35 MG/DL (ref 0–30)
WBC # BLD AUTO: 5.2 X10(3) UL (ref 4–11)

## 2025-06-18 PROCEDURE — 84443 ASSAY THYROID STIM HORMONE: CPT

## 2025-06-18 PROCEDURE — 81003 URINALYSIS AUTO W/O SCOPE: CPT

## 2025-06-18 PROCEDURE — 99396 PREV VISIT EST AGE 40-64: CPT | Performed by: FAMILY MEDICINE

## 2025-06-18 PROCEDURE — 83036 HEMOGLOBIN GLYCOSYLATED A1C: CPT | Performed by: FAMILY MEDICINE

## 2025-06-18 PROCEDURE — 3074F SYST BP LT 130 MM HG: CPT | Performed by: FAMILY MEDICINE

## 2025-06-18 PROCEDURE — 85025 COMPLETE CBC W/AUTO DIFF WBC: CPT

## 2025-06-18 PROCEDURE — 87086 URINE CULTURE/COLONY COUNT: CPT

## 2025-06-18 PROCEDURE — 3008F BODY MASS INDEX DOCD: CPT | Performed by: FAMILY MEDICINE

## 2025-06-18 PROCEDURE — 99214 OFFICE O/P EST MOD 30 MIN: CPT | Performed by: FAMILY MEDICINE

## 2025-06-18 PROCEDURE — 3079F DIAST BP 80-89 MM HG: CPT | Performed by: FAMILY MEDICINE

## 2025-06-18 PROCEDURE — 36415 COLL VENOUS BLD VENIPUNCTURE: CPT

## 2025-06-18 PROCEDURE — 82306 VITAMIN D 25 HYDROXY: CPT

## 2025-06-18 PROCEDURE — 80061 LIPID PANEL: CPT

## 2025-06-18 PROCEDURE — 84439 ASSAY OF FREE THYROXINE: CPT

## 2025-06-18 PROCEDURE — 80053 COMPREHEN METABOLIC PANEL: CPT

## 2025-06-18 NOTE — PROGRESS NOTES
No s/s of pain or nausea. Will d/c pt when aaox4 and able to tolerate PO fluids.    Subjective:   Steven Hinton is a 50 year old female who presents for Well Adult (Reviewed Preventative/Wellness form with patient.)     History/Other:   History of Present Illness  Steven Hinton is a 50 year old female who presents for annual physical.     She experiences significant urinary frequency, urinating at least once an hour, sometimes more frequently, regardless of fluid intake. This issue has persisted for about three to four years. A urologist previously noted her bladder was not emptying completely and prescribed medication (does not know name), which was ineffective. She does not experience incontinence but describes a sudden urgency to urinate, sometimes with a pause before needing to go again within minutes.    She has been feeling 'really low' over the past few weeks, attributing some of this to perimenopause and increased stress from moving and work. She describes a lack of motivation and a desire to go to bed immediately after coming home. No thoughts of self-harm or harm to others.    She has a history of a lump on her back, previously evaluated via ultrasound and determined to be fluid-filled cyst. It has become larger and more uncomfortable, especially when sitting against something hard. This lump has been present for about three years.    She is currently on Zepbound, with a dose of 10, for weight management and has lost a significant amount of weight, dropping from the 280s to the 240s. She recently met with a dietitian and is scheduled to meet again next week.     She reports occasional chest pain described as a 'little pinch' in the middle of her chest, occurring rarely and not associated with physical activity. The pain lasts only a split second. Her mother has a history of heart disease, including heart attacks. No associated dyspnea, palpitations.     She uses albuterol occasionally before exercise, which she finds helpful. No issues with acid reflux. She is in the process of moving  to [place] and has joined a new gym there.     Chief Complaint Reviewed and Verified  Nursing Notes Reviewed and   Verified  Tobacco Reviewed  Allergies Reviewed  Medications Reviewed    Problem List Reviewed  Medical History Reviewed  Surgical History   Reviewed  OB Status Reviewed  Family History Reviewed  Social History   Reviewed         Tobacco:  She smoked tobacco in the past but quit greater than 12 months ago.  Tobacco Use[1]     Current Medications[2]    PHQ-9 TOTAL SCORE: 10  , done 6/18/2025   Little interest or pleasure in doing things: 2    Feeling down, depressed, or hopeless: 2    Trouble falling or staying asleep, or sleeping too much: 2     Feeling tired or having little energy: 2    Feeling bad about yourself - or that you are a failure or have let yourself or your family down: 2    If you checked off any problems, how difficult have these problems made it for you to do your work, take care of things at home, or get along with other people?: Somewhat difficult    Last [place] Suicide Screening on 6/18/2025 was No Risk.       Review of Systems:  Pertinent items are noted in HPI.    Objective:   /80   Pulse 90   Ht 5' 4\" (1.626 m)   Wt 248 lb (112.5 kg)   LMP 09/25/2024 (Exact Date)   SpO2 98%   BMI 42.57 kg/m²  Estimated body mass index is 42.57 kg/m² as calculated from the following:    Height as of this encounter: 5' 4\" (1.626 m).    Weight as of this encounter: 248 lb (112.5 kg).  Physical Exam  GENERAL: Alert, cooperative, well developed, no acute distress, obese  HEENT: Normocephalic, normal oropharynx, moist mucous membranes.  NECK: Normal range of motion. No LAD. Thyroid exam wnl.   CHEST: Clear to auscultation bilaterally, no wheezes, rhonchi, or crackles.  CARDIOVASCULAR: Normal heart rate and rhythm, S1 and S2 normal without murmurs.  ABDOMEN: Soft, non-tender, non-distended, without organomegaly, normal bowel sounds.  EXTREMITIES: No cyanosis or  edema.  MUSCULOSKELETAL: Shoulders and hips with normal range of motion.  SKIN: Mass palpated on L mid back  NEUROLOGICAL: Cranial nerves grossly intact, moves all extremities without gross motor or sensory deficit.      Assessment & Plan:   1. Wellness examination (Primary)  -Immunizations: Consider tdap, prevnar   -Metabolic: BMI 42. BP wnl. Due for annual labs   -Cancer screening: due for CRC, mammo.   -Communicable disease: low risk   -Other preventative: follow with dentistry and optometry.   -Lifestyle: Follow a well balanced healthy diet with emphasis on fruits, vegetables, whole grains, lean meats. Limit processed and junk foods. Aim for at least 150 minutes of moderate intensity exercise weekly. Make sure you are staying adequately hydrated. Aim to get 7-9 hours of sleep nightly.     2. Prediabetes  Check A1c. Continue low carb diet, regular exercise.   -     Hemoglobin A1C    3. Screening mammogram for breast cancer  -     [facility] ARA 2D+3D SCREENING BILAT (CPT=77067/32333); Future; Expected date: 06/18/2025    4. Vitamin D deficiency  -     Vitamin D; Future; Expected date: 06/18/2025    5. Laboratory exam ordered as part of routine general medical examination  -     CBC With Differential With Platelet; Future; Expected date: 06/18/2025    6. Adjustment disorder with depressed mood  Refer to therapy. Consider pharmacological mgmt if needed in future. F/u PRN.   -     Behavioral Health Consultation    7. Urinary retention  Frequent urination with incomplete bladder emptying. Previous treatment ineffective. Urgency without incontinence. Urologist reevaluation and possible pelvic floor therapy suggested.  -     Urinalysis, Routine; Future; Expected date: 06/18/2025  -     Urine Culture, Routine; Future; Expected date: 06/18/2025  -     Cancel: Urology Referral - In Network  -     Pelvic Floor Therapy - [place] Location  -     Urology Referral - In Network    8. Gastroesophageal reflux disease, unspecified  whether esophagitis present  Stable, CPM    9. Exercise-induced asthma (HCC)  Stable, CPM    10. Atypical chest pain  Does not appear cardiac in nature. Advised to monitor closely. Will obtain heart scan. Reviewed s/s angina, precautions.     11. Mixed hyperlipidemia  Due for labs. Continue low fat heart healthy diet, regular exercise. Will check heart scan.   -     CT CALCIUM SCORING; Future; Expected date: 2025    12. Cyst of skin  F/u with surgery for surgical consultation.    13. Obesity   Lost 40 pounds with Zepbound, currently at 10 mg. Weight decreased from 280s to 240s. Working with dietitian. Labs to be reviewed for cholesterol and health improvements.  - Continue Zepbound at current dose.  - Follow up with dietitian next week.  - Review lab results for cholesterol and other health markers.      Return in about 6 months (around 2025).        Don Grimes MD, 2025, 8:59 AM             [1]   Social History  Tobacco Use   Smoking Status Former    Current packs/day: 0.00    Types: Cigarettes    Start date: 10/22/1989    Quit date: 10/22/1999    Years since quittin.6   Smokeless Tobacco Never   [2]   Current Outpatient Medications   Medication Sig Dispense Refill    Tirzepatide-Weight Management (ZEPBOUND) 10 MG/0.5ML Subcutaneous Solution Auto-injector Inject 10 mg into the skin once a week. Start after completing full 4 weeks on 7.5 mg weekly dose. 2 mL 3    PANTOPRAZOLE 40 MG Oral Tab EC TAKE 1 TABLET(40 MG) BY MOUTH EVERY MORNING BEFORE BREAKFAST 90 tablet 1    ergocalciferol 1.25 MG (37535 UT) Oral Cap Take 1 capsule (50,000 Units total) by mouth once a week. With food 12 capsule 1    LEVOTHYROXINE 25 MCG Oral Tab TAKE 1 TABLET(25 MCG) BY MOUTH BEFORE BREAKFAST 90 tablet 1    cyclobenzaprine 10 MG Oral Tab Take 1 tablet (10 mg total) by mouth nightly as needed for Muscle spasms. 30 tablet 1    diclofenac 75 MG Oral Tab EC Take 1 tablet (75 mg total) by mouth 2 (two) times  daily. 60 tablet 1    albuterol 108 (90 Base) MCG/ACT Inhalation Aero Soln Inhale 2 puffs into the lungs every 6 (six) hours as needed for Wheezing or Shortness of Breath. 1 each 2

## 2025-06-18 NOTE — PATIENT INSTRUCTIONS
Health Screening Guidelines, Women Ages 50 to 64   Screening tests are key to managing your health. A screening test is done to find problems in people who don't have any symptoms. Screening tests are not used to diagnose. They are used to find out if more testing is needed. The goal may be to find a disease early so it can be treated with more success. Or the goal may be to find a disease early so you can make lifestyle changes.   Below are guidelines for women ages 50 to 64. Work with your healthcare provider. Make sure you’re up-to-date on what you need.   Screening  Who needs it  How often    Type 2 diabetes or prediabetes  All women in this age group who are overweight or obese, or had gestational diabetes  At least every 3 years    Type 2 diabetes All women with prediabetes  Every year   Unhealthy alcohol use  All women in this age group  At routine exams   Blood pressure All women in this age group  Once a year if your blood pressure is normal. Normal blood pressure is less than 120/80 mm Hg. If your blood pressure is higher than this, follow the advice of your healthcare provider.    Breast cancer All women in this age group at average risk. Expert groups vary on their advice so talk with your provider about your specific situation.      A mammogram is advised every 1 or 2 years. Talk with your provider about your risk factors. Ask how often you need one.       The [place]. Preventive Services Task Force advises a mammogram every 2 years starting at age 40.  The American Cancer Society advises yearly mammograms for women through ages 45 to 54 and mammograms every 1 to 2 years for women ages 55 and older.      All women should know how their breasts normally look and feel. They should know the benefits and risks of breast cancer screening with mammograms.    Cervical cancer All women in this age group, unless they have had a complete hysterectomy  Primary HPV test every 5 years, a co-test (an HPV test with a  Pap test) every 5 years, or a Pap test every 3 years. Talk with your healthcare provider about your risks and whether you need screening more often.    Chlamydia Women who are sexually active and at higher risk of infection  At yearly routine exams    Colorectal cancer All women in this age group at average risk  Talk with your healthcare provider about which test below is right for you:   Flexible sigmoidoscopy every 5 years  Colonoscopy every 10 years  CT colonography (virtual colonoscopy) every 5 years  Yearly fecal occult blood test  Yearly fecal immunochemical test (FIT)  Stool DNA with FIT test every 3 years  If you have a test that is not a colonoscopy and have an abnormal test result, you will need a colonoscopy.   You may need to be screened more or less often. This is based on personal or family health history. Talk with your healthcare provider.    Depression All adults At routine exams, including, all pregnant and postpartum women    Gonorrhea Sexually active women who are at higher risk of infection  At yearly routine exams    Hepatitis C All adults At routine exams   High cholesterol or triglycerides  All adults     At least every 5 years up to age 55.  Women ages 55 to 65 should be screened every 1 to 2 years.  Talk with your healthcare provider about your risk and how often to get screened.    HIV All adults At least once between the ages of 13 and 64. Women at ongoing risk should be screened more often. Talk with your healthcare provider about your risk and how often to be screened.    Lung cancer All women in this age group who are in fairly good health, are at higher risk for lung cancer, and who:       Smoke or used to smoke  Have a 20-pack- per year smoking history (1 pack a day for 20 years or 2 packs a day for 10 years)      Expert groups vary in their advice. Talk with your healthcare provider.  Yearly lung cancer screening with a low-dose CT scan (LDCT). Talk with your healthcare provider.     Obesity All adults At routine exams   Osteoporosis Women who are postmenopausal  Talk with your healthcare provider.    Syphilis Women who are at higher risk of infection.  Talk with your healthcare provider.    Tuberculosis Women who are at higher risk of infection  Talk with your healthcare provider.    Vision All adults At least every 1 to 2 years or as directed by your healthcare provider.    Health counseling Who needs it How often   BRCA gene mutation testing for breast and ovarian cancer susceptibility  Women who are at higher risk of having this gene mutation. Talk with your healthcare provider.  When your risk is known    Breast cancer and chemoprevention  Women who are at high risk for breast cancer. Talk with your healthcare provider.  When your risk is known    Diet and exercise Women who are overweight or obese  When diagnosed, and then at routine exams    Sexually transmitted infection (STI) prevention  Women who are at higher risk of infection. Talk with your healthcare provider.  At routine exams   Use of tobacco and the health effects it can cause  All adults Every exam   [place] last reviewed this educational content on 5/1/2024  This information is for informational purposes only. This is not intended to be a substitute for professional medical advice, diagnosis, or treatment. Always seek the advice and follow the directions from your physician or other qualified health care provider.  © 4686-8417 [employer]. All rights reserved. This information is not intended as a substitute for professional medical care. Always follow your healthcare professional's instructions.

## 2025-06-18 NOTE — PROGRESS NOTES
The following individual(s) verbally consented to be recorded using ambient AI listening technology and understand that they can each withdraw their consent to this listening technology at any point by asking the clinician to turn off or pause the recording:    Patient name: Steven Hinton

## 2025-06-23 NOTE — PROGRESS NOTES
[facility] Weight Management follow up via video visit:    Subjective    This visit is conducted using Telemedicine with live, interactive video and audio.    Chief Complaint:  Routine follow up visit for lifestyle and medical management for overweight, obesity, or morbid obesity. LOV in clinic weight: 266#.    PMH reviewed. Bariatric surgery planned or hx of surgery in the past: 10/10 interest.    HPI:   Steven Hinton is a 50 year old female who is being followed up today for lifestyle and medical management as deemed appropriate for overweight, obesity or morbid obesity. Patient reports weight loss monitoring at home via scale with a weight of 247#. This appears to be a weight loss since LOV 4 months ago in clinic. Patient has been consistent with medication and tolerating Zepbound well without side effects.    Questions/Concerns/Comments since LOV: Saw Yuri for NP consult on 5/15/25, progress note reviewed. GERD controlled. Recent labs by PCP reviewed in EMR with improved HgbA1c and Vitamin D. Patient moved to [place] since LOV. Continues in relationship with boyfriend who has a hx of bariatric surgery. Often he is preparing meals. She is feeling good overall. More energy and endurance.    Lifestyle/Social Hx Reviewed:    [facility] Medical Weight Loss Follow Up    Question 6/23/2025  7:54 AM CDT - Filed by Patient   Please describe a success moment: Continuing weight loss and my clothes getting more loose.   Please describe a challenging moment/needs for improvement: Sometimes work schedule being hectic and eating fast food in the moment   Please complete this 24 hour food journal, listing everything you had to eat in the past day. Include the average time of day you ate these meals at    List foods, qty and prep for breakfast: Protein shake, coffee with cream   List foods, qty and prep for lunch. No lunch   List foods, qty and prep for dinner. 2 fish fillets, cucumber & tomato salad, rice   List foods, qty and  prep for snacks. Pringles, dots pretzels   List the types and qty of fluids consumed 1 diet coke, 1 cup coffee, 3 bottles water   On average, how many meals did you eat out per week? 3   Exercise    How many days per week are you active or exercise 3   On average, how many days were anaerobic (strength/resistance) exercises performed? 2   On average, how many days were aerobic (cardio) exercises performed? 3   Perceived level of exertion on a scale of 1-5, with 5 being very intense: 3   Stress    Average stress level on a scale of 1-10, with 10 being extremely stressed: 7   If greater than 5/1O how would you grade your coping mechanisms? fair   Sleep hours and integrity    How many hours of uninterrupted sleep do you get a night: 7   Do you feel rested in the morning: No   If no, what may have been disrupting your sleep? Not sure   Please list any goal(s) for your next visit Continue to lose weight, add extra day for exercise at the gym     ROS  General: feeling well, denies fatigue  EYES: denies vision changes or high pain/pressure.  CV: denies cp, palpitations  Resp: denies sob  GI: denies abdominal pain. Denies N/V/D/C.  Neuro: denies paresthesia or cognitive changes  Psych: denies any mood changes    Physical Exam:  >   BP Readings from Last 3 Encounters:   06/18/25 120/80   02/13/25 138/86   12/03/24 126/74       Home weight: see above  Home BP: not available  Home blood sugars: n/a  Today's calculated BMI from reported weight: 42.55    General: patient speaking in full sentences, no increased work of breathing. alert, appears stated age, cooperative, no distress, and morbidly obese  HENT: normocephalic  Resp: Breathing is non labored  Psych: patient appears cheerful, smiling, making good eye contact    Diagnoses and all orders for this visit:    Encounter for therapeutic drug monitoring  -     Tirzepatide-Weight Management (ZEPBOUND) 12.5 MG/0.5ML Subcutaneous Solution Auto-injector; Inject 12.5 mg into the  skin once a week.  -     Tirzepatide-Weight Management (ZEPBOUND) 15 MG/0.5ML Subcutaneous Solution Auto-injector; Inject 15 mg into the skin once a week. Begin after completing full 4 weeks on 12.5 mg weekly dose.    Class 3 severe obesity with serious comorbidity and body mass index (BMI) of 45.0 to 49.9 in adult, unspecified obesity type  Comments:  Baseline BMI: 50.61 (11/16/2021)  Orders:  -     Tirzepatide-Weight Management (ZEPBOUND) 12.5 MG/0.5ML Subcutaneous Solution Auto-injector; Inject 12.5 mg into the skin once a week.  -     Tirzepatide-Weight Management (ZEPBOUND) 15 MG/0.5ML Subcutaneous Solution Auto-injector; Inject 15 mg into the skin once a week. Begin after completing full 4 weeks on 12.5 mg weekly dose.    Prediabetes  Comments:  6/2025 HgbA1c WNL - controlled with weight loss and medication  Orders:  -     Tirzepatide-Weight Management (ZEPBOUND) 12.5 MG/0.5ML Subcutaneous Solution Auto-injector; Inject 12.5 mg into the skin once a week.  -     Tirzepatide-Weight Management (ZEPBOUND) 15 MG/0.5ML Subcutaneous Solution Auto-injector; Inject 15 mg into the skin once a week. Begin after completing full 4 weeks on 12.5 mg weekly dose.    GIDEON (obstructive sleep apnea)  Comments:  no CPAP use  Orders:  -     Tirzepatide-Weight Management (ZEPBOUND) 12.5 MG/0.5ML Subcutaneous Solution Auto-injector; Inject 12.5 mg into the skin once a week.  -     Tirzepatide-Weight Management (ZEPBOUND) 15 MG/0.5ML Subcutaneous Solution Auto-injector; Inject 15 mg into the skin once a week. Begin after completing full 4 weeks on 12.5 mg weekly dose.    Gastroesophageal reflux disease, unspecified whether esophagitis present    Hypothyroidism, unspecified type  Comments:  6/2025 TSH 4.1 and FreeT4 1.4, will monitor with weight loss        Patient Instructions   Continue making lifestyle changes that focus on good nutrition, regular exercise and stress management.    Medication Plan: Finish up Zepbound 10 mg weekly  pens and then increase to 12.5 mg weekly x4 weeks and then increase to 15 mg weekly thereafter.    Tips while taking an injectable medication:    Be an intuitive eater. Listen to your hunger and fullness signals, stopping when you are full.  Consume protein and produce in your day, striving for a rainbow of color of produce.  Reduce portions to staring size of 1 cup size and check in with your gut to see if you are full. Set the timer to slow down your eating pace to allow for 15-20 minutes to complete a meal. Recommend following the \"2 bite rule\".  Reduce refined sugars and high fat foods, as they may contribute to greater side effects of nausea and heartburn.  Stop eating 3 hours before bedtime to allow your food to digest.  Remain hydrated with water or non caloric and non caffeine beverages.  Use over the counter raphael lozenge/supplement to help reduce nausea if needed.  If you have been off your medication for more than 2 weeks please notify our office to determine next dosing, as return to previous dose may not be appropriate or tolerated.  Zepbound can be kept at room temperature for up to 3 weeks.    Next steps to work on before next visit include: Keep up the great work in making lifestyle changes!    Eating Out vs. Eating at Home    by Chef Manjeet Guerra and Shandra Alfaro, MS, RD, LD    OAC at www.obesityaction.org Fall 2010 Resource    I haven’t met a person who doesn’t like going out to a restaurant to eat on occasion. If the atmosphere is just right, the food is tasty and the service is great, the meal is considered perfect. But, is it?    The very first restaurant in the world opened in [place] in 1765. A , Azeem Marquez, served a single dish: sheep’s feet simmered in a white sauce. As for the [place] the [employer] is the oldest restaurant in [place] as well as the oldest restaurant in continuous service. Since 1826, their doors have always been open to diners.    I have had the  pleasure of eating at the [employer]. The food was just ok, the service average, but the atmosphere was amazing. In a nutshell, it’s not always the food that makes the restaurant, but the atmosphere or nostalgia.    Restaurants are often looked at as a convenience -- a place to relax and have a good meal. However, I challenge this theory. Think about this: can you go to a restaurant and eat in your underwear and favorite pair of woolly socks? A little ridiculous, but the point is that you’re most comfortable in your own home. In addition, eating at home is more convenient, costs less and above all, it can be a lot healthier.    Serving Sizes  As Americans, we have become accustomed to and expect larger portion sizes from restaurants. “I want my money’s worth,” and “We love coming here because the portion sizes are huge,” are the most common statements I hear when going to a restaurant. Most restaurants serve two to three times more than the healthy portion sizes recommended by the [place] Dietary Guidelines.    Not only is this not healthy, but most people don’t know what a proper portion size is. They tend to overeat and maybe “eat the whole thing.” We have become accustomed to expecting a filled to-go box to take home. You will notice the “made at home” portion sizes in the chart above are smaller and are the recommended serving size. Remember, proper serving sizes mean less calories consumed.    Savor the Flavor  Restaurants are in business to make money, and calorie-counting is not at the top of their list. Large chain restaurants have corporate  whose sole responsibility is to create mouth-watering, can’t-put-down food. Calories, fat, carbohydrates and many other nutrient values that are recommended are typically lost in the sea of making the tastiest dish with little regard for nutrition.    Two fried chicken patties used as a bun with cheese and mejía stuffed in the middle is being sold in a major  chicken chain. Another chain sells an awesome Asian salad as far as taste goes, but with its toppings and salad dressing, it has more than 800 calories.    At a restaurant, you have almost no control over how most items are prepared, leaving your health and wellness in the hands of the  in the back. At home, you control how much salt is being used, what fat you use to cook with, the quality of the food product and most of all, you’re in control of your health and wellness.    Time Saving  “Eating at a restaurant saves me time,” is far from the truth. The average person does not want to spend more than 20 minutes to prepare a meal for their family. Choose a recipe or food item that requires the amount of time you have to spend in the kitchen.    Plan ahead for days when you have kids’ soccer practice and you know a meal needs to be quick and nutritious (such as chicken Caesar salad). Then, on the days where life gives you more time, plan a pot roast with veggies where the prep time is 15 minutes and the cook time is three hours. As for the restaurant being quicker… if getting in the car and driving to the restaurant, waiting to be seated, waiting to order your food, waiting to get your food, paying for your meal and then driving home is quicker, then you might want to try a different recipe.    Take Responsibility  When all is said and done, you must take responsibility for your own health and wellness. Restaurants provide a great service, but in the end, you need to make decisions based on where you are in your weight management goals.      Healthy Tips for Eating Out  Choices  It’s not easy to change the habits of a lifetime. Experts say that it can take 6 months just to change one old habit for a healthier one. That’s why gradual change is so important. These tips are reminders of the dozens of small ways you can change your habits when eating out. Don’t expect to follow each tip all the time. Think of the  tips as options for handling situations that may have given you trouble in the past.  You don't have to give up eating out to cut down on fat, cholesterol, and salt. You just need to think about what you order. Many menus highlight low-fat and low-sodium dishes. But if you can't find what you want, ask. Explain what you need to the  or . Or ask to see printed nutrition information.    Ask for what you want  Ask that foods be prepared with little or no fat and with no added salt.  Ask that sauces be left off or served on the side. Choose sauces made with tomato instead of with cream or cheese.  Ask for steamed rice or a baked or boiled potato, without butter or sour cream.  Ask that vegetables be steamed and served with no butter or sauce. Ask for lemon juice or vinegar to sprinkle on them for flavor.  Keep these tips in mind  Choose minestrone or vegetable soups. Ask about sodium content.  Order salad dressing on the side. Dip your fork in the dressing, then in the salad.  Look for fish, chicken, turkey, or meat that is broiled, roasted, poached, or steamed.  Order 1 or 2 low-fat appetizers or soup and a salad instead of a main dish. Or eat only half of the main dish and take the rest home.  If you want a dessert, try fresh fruit, nonfat yogurt, or sorbet. Or share a dessert.  Fast food tips  Choose grilled chicken sandwiches and plain hamburgers. Add vegetables to your burgers and sandwiches and go lightly on creamy sauces like mayonnaise and tartar sauce. Choose whole-grain buns or bread when available.  Remove the skin from fried chicken and choose mashed potatoes, corn on the cob, and baked beans on the side.  Order a broiled chicken salad and pile on fresh vegetables from the salad bar. Use a small amount of low-fat dressing.  Top a baked potato with cottage cheese and vegetables from the salad bar.  Ask for a pizza topped with vegetables.  Watch portion size. Order regular size meals, not  super-sized. A kid's meal may be enough and may have healthier options for sides.  Stay away from milkshakes, sugary sodas, and sweetened drinks. Instead choose low-fat or skim milk, water, unsweetened ice tea, and sparkling water.  Restaurant tips  Plan ahead. Decide what you will eat before you get to the restaurant. If you plan to eat high-fat foods, choose low-fat foods during the rest of the day.  Don’t be afraid to ask how foods are prepared and whether they can be done without high-fat ingredients, such as cream, butter, cheese, and oil.  Ask for sauces and salad dressings on the side and use just a tablespoon. Ask for low-fat dressings.  Try starting your meal with a bowl of vegetable soup or a salad. This may help to prevent you from overeating during the meal.  Order meat, poultry, and fish broiled, grilled, baked, poached, or steamed. Always remove the skin from chicken.  Choose Mexican dishes made with soft, rather than crispy tortillas. For toppings, use salsa and lettuce, rather than sour cream and cheese.  For dessert, enjoy fruit, sorbet, or low-fat frozen yogurt. Share a rich dessert with friends.  Make a meal out of a low-fat appetizer (such as shrimp cocktail or fresh pasta), bread, and salad as your main meal. Ask for an appetizer-size portion of an entree.    Foods to avoid  Donuts, muffins, and pastries  Coconut, vegetables with butter, cream, or cheese sauce  Cream, whole milk, and powdered creamers  Tracy, liver, luncheon meats, ground meat, and canned fish in oil  Sweets and foods made with butter, coconut or palm oil, or hydrogenated fats    © 3024-3749 [employer]. [address] [place] [address], [place] [address]. All rights reserved. This information is not intended as a substitute for professional medical care. Always follow your healthcare professional's instructions.      Return in about 3 months (around 9/24/2025) for as scheduled.    DOCUMENTATION OF TIME SPENT: Code selection  for this visit was based on time spent : 30 minutes on date of service in preparing to see the patient, obtaining and/or reviewing separately obtained history, performing a medically appropriate examination, counseling and educating the patient/family/caregiver, ordering medications or testing, referring and communicating with other healthcare providers, documenting clinical information in the electronic medical record, independently interpreting results and communicating results to the patient/family/caregiver and care coordination with the patient's other providers.    Answers submitted by the patient for this visit:  Medical Weight Loss Follow Up (Submitted on 6/23/2025)  If greater than 5/1O how would you grade your coping mechanisms?: fair

## 2025-06-24 ENCOUNTER — TELEMEDICINE (OUTPATIENT)
Dept: INTERNAL MEDICINE CLINIC | Facility: CLINIC | Age: 50
End: 2025-06-24
Payer: COMMERCIAL

## 2025-06-24 DIAGNOSIS — E03.9 HYPOTHYROIDISM, UNSPECIFIED TYPE: ICD-10-CM

## 2025-06-24 DIAGNOSIS — K21.9 GASTROESOPHAGEAL REFLUX DISEASE, UNSPECIFIED WHETHER ESOPHAGITIS PRESENT: ICD-10-CM

## 2025-06-24 DIAGNOSIS — R73.03 PREDIABETES: ICD-10-CM

## 2025-06-24 DIAGNOSIS — G47.33 OSA (OBSTRUCTIVE SLEEP APNEA): ICD-10-CM

## 2025-06-24 DIAGNOSIS — Z51.81 ENCOUNTER FOR THERAPEUTIC DRUG MONITORING: Primary | ICD-10-CM

## 2025-06-24 DIAGNOSIS — E66.813 CLASS 3 SEVERE OBESITY WITH SERIOUS COMORBIDITY AND BODY MASS INDEX (BMI) OF 45.0 TO 49.9 IN ADULT, UNSPECIFIED OBESITY TYPE: ICD-10-CM

## 2025-06-24 PROBLEM — E66.9 OBESITY, UNSPECIFIED: Status: ACTIVE | Noted: 2025-02-23

## 2025-06-24 PROCEDURE — 98006 SYNCH AUDIO-VIDEO EST MOD 30: CPT | Performed by: NURSE PRACTITIONER

## 2025-06-24 RX ORDER — TIRZEPATIDE 12.5 MG/.5ML
12.5 INJECTION, SOLUTION SUBCUTANEOUS WEEKLY
Qty: 2 ML | Refills: 0 | Status: SHIPPED | OUTPATIENT
Start: 2025-06-24

## 2025-06-24 RX ORDER — TIRZEPATIDE 15 MG/.5ML
15 INJECTION, SOLUTION SUBCUTANEOUS WEEKLY
Qty: 2 ML | Refills: 2 | Status: SHIPPED | OUTPATIENT
Start: 2025-06-24

## 2025-06-24 NOTE — PATIENT INSTRUCTIONS
Continue making lifestyle changes that focus on good nutrition, regular exercise and stress management.    Medication Plan: Finish up Zepbound 10 mg weekly pens and then increase to 12.5 mg weekly x4 weeks and then increase to 15 mg weekly thereafter.    Tips while taking an injectable medication:    Be an intuitive eater. Listen to your hunger and fullness signals, stopping when you are full.  Consume protein and produce in your day, striving for a rainbow of color of produce.  Reduce portions to staring size of 1 cup size and check in with your gut to see if you are full. Set the timer to slow down your eating pace to allow for 15-20 minutes to complete a meal. Recommend following the \"2 bite rule\".  Reduce refined sugars and high fat foods, as they may contribute to greater side effects of nausea and heartburn.  Stop eating 3 hours before bedtime to allow your food to digest.  Remain hydrated with water or non caloric and non caffeine beverages.  Use over the counter raphael lozenge/supplement to help reduce nausea if needed.  If you have been off your medication for more than 2 weeks please notify our office to determine next dosing, as return to previous dose may not be appropriate or tolerated.  Zepbound can be kept at room temperature for up to 3 weeks.    Next steps to work on before next visit include: Keep up the great work in making lifestyle changes!    Eating Out vs. Eating at Home    by Chef Manjeet Guerra and Shandra Alfaro, MS, RD, LD    OAC at www.obesityaction.org Fall 2010 Resource    I haven’t met a person who doesn’t like going out to a restaurant to eat on occasion. If the atmosphere is just right, the food is tasty and the service is great, the meal is considered perfect. But, is it?    The very first restaurant in the world opened in [place] in 1765. A , Azeem Marquez, served a single dish: sheep’s feet simmered in a white sauce. As for the [place] the [employer] is the oldest  restaurant in [place] as well as the oldest restaurant in continuous service. Since 1826, their doors have always been open to diners.    I have had the pleasure of eating at the [employer]. The food was just ok, the service average, but the atmosphere was amazing. In a nutshell, it’s not always the food that makes the restaurant, but the atmosphere or nostalgia.    Restaurants are often looked at as a convenience -- a place to relax and have a good meal. However, I challenge this theory. Think about this: can you go to a restaurant and eat in your underwear and favorite pair of woolly socks? A little ridiculous, but the point is that you’re most comfortable in your own home. In addition, eating at home is more convenient, costs less and above all, it can be a lot healthier.    Serving Sizes  As Americans, we have become accustomed to and expect larger portion sizes from restaurants. “I want my money’s worth,” and “We love coming here because the portion sizes are huge,” are the most common statements I hear when going to a restaurant. Most restaurants serve two to three times more than the healthy portion sizes recommended by the [place] Dietary Guidelines.    Not only is this not healthy, but most people don’t know what a proper portion size is. They tend to overeat and maybe “eat the whole thing.” We have become accustomed to expecting a filled to-go box to take home. You will notice the “made at home” portion sizes in the chart above are smaller and are the recommended serving size. Remember, proper serving sizes mean less calories consumed.    Savor the Flavor  Restaurants are in business to make money, and calorie-counting is not at the top of their list. Large chain restaurants have corporate  whose sole responsibility is to create mouth-watering, can’t-put-down food. Calories, fat, carbohydrates and many other nutrient values that are recommended are typically lost in the sea of making the tastiest  dish with little regard for nutrition.    Two fried chicken patties used as a bun with cheese and mejía stuffed in the middle is being sold in a major chicken chain. Another chain sells an awesome Asian salad as far as taste goes, but with its toppings and salad dressing, it has more than 800 calories.    At a restaurant, you have almost no control over how most items are prepared, leaving your health and wellness in the hands of the  in the back. At home, you control how much salt is being used, what fat you use to cook with, the quality of the food product and most of all, you’re in control of your health and wellness.    Time Saving  “Eating at a restaurant saves me time,” is far from the truth. The average person does not want to spend more than 20 minutes to prepare a meal for their family. Choose a recipe or food item that requires the amount of time you have to spend in the kitchen.    Plan ahead for days when you have kids’ soccer practice and you know a meal needs to be quick and nutritious (such as chicken Caesar salad). Then, on the days where life gives you more time, plan a pot roast with veggies where the prep time is 15 minutes and the cook time is three hours. As for the restaurant being quicker… if getting in the car and driving to the restaurant, waiting to be seated, waiting to order your food, waiting to get your food, paying for your meal and then driving home is quicker, then you might want to try a different recipe.    Take Responsibility  When all is said and done, you must take responsibility for your own health and wellness. Restaurants provide a great service, but in the end, you need to make decisions based on where you are in your weight management goals.      Healthy Tips for Eating Out  Choices  It’s not easy to change the habits of a lifetime. Experts say that it can take 6 months just to change one old habit for a healthier one. That’s why gradual change is so important. These  tips are reminders of the dozens of small ways you can change your habits when eating out. Don’t expect to follow each tip all the time. Think of the tips as options for handling situations that may have given you trouble in the past.  You don't have to give up eating out to cut down on fat, cholesterol, and salt. You just need to think about what you order. Many menus highlight low-fat and low-sodium dishes. But if you can't find what you want, ask. Explain what you need to the  or . Or ask to see printed nutrition information.    Ask for what you want  Ask that foods be prepared with little or no fat and with no added salt.  Ask that sauces be left off or served on the side. Choose sauces made with tomato instead of with cream or cheese.  Ask for steamed rice or a baked or boiled potato, without butter or sour cream.  Ask that vegetables be steamed and served with no butter or sauce. Ask for lemon juice or vinegar to sprinkle on them for flavor.  Keep these tips in mind  Choose minestrone or vegetable soups. Ask about sodium content.  Order salad dressing on the side. Dip your fork in the dressing, then in the salad.  Look for fish, chicken, turkey, or meat that is broiled, roasted, poached, or steamed.  Order 1 or 2 low-fat appetizers or soup and a salad instead of a main dish. Or eat only half of the main dish and take the rest home.  If you want a dessert, try fresh fruit, nonfat yogurt, or sorbet. Or share a dessert.  Fast food tips  Choose grilled chicken sandwiches and plain hamburgers. Add vegetables to your burgers and sandwiches and go lightly on creamy sauces like mayonnaise and tartar sauce. Choose whole-grain buns or bread when available.  Remove the skin from fried chicken and choose mashed potatoes, corn on the cob, and baked beans on the side.  Order a broiled chicken salad and pile on fresh vegetables from the salad bar. Use a small amount of low-fat dressing.  Top a baked potato with  cottage cheese and vegetables from the salad bar.  Ask for a pizza topped with vegetables.  Watch portion size. Order regular size meals, not super-sized. A kid's meal may be enough and may have healthier options for sides.  Stay away from milkshakes, sugary sodas, and sweetened drinks. Instead choose low-fat or skim milk, water, unsweetened ice tea, and sparkling water.  Restaurant tips  Plan ahead. Decide what you will eat before you get to the restaurant. If you plan to eat high-fat foods, choose low-fat foods during the rest of the day.  Don’t be afraid to ask how foods are prepared and whether they can be done without high-fat ingredients, such as cream, butter, cheese, and oil.  Ask for sauces and salad dressings on the side and use just a tablespoon. Ask for low-fat dressings.  Try starting your meal with a bowl of vegetable soup or a salad. This may help to prevent you from overeating during the meal.  Order meat, poultry, and fish broiled, grilled, baked, poached, or steamed. Always remove the skin from chicken.  Choose Mexican dishes made with soft, rather than crispy tortillas. For toppings, use salsa and lettuce, rather than sour cream and cheese.  For dessert, enjoy fruit, sorbet, or low-fat frozen yogurt. Share a rich dessert with friends.  Make a meal out of a low-fat appetizer (such as shrimp cocktail or fresh pasta), bread, and salad as your main meal. Ask for an appetizer-size portion of an entree.    Foods to avoid  Donuts, muffins, and pastries  Coconut, vegetables with butter, cream, or cheese sauce  Cream, whole milk, and powdered creamers  Tracy, liver, luncheon meats, ground meat, and canned fish in oil  Sweets and foods made with butter, coconut or palm oil, or hydrogenated fats    © 9521-2603 [employer]. [address] [place] [address], [place] [address]. All rights reserved. This information is not intended as a substitute for professional medical care. Always follow your healthcare  professional's instructions.

## 2025-06-26 ENCOUNTER — OFFICE VISIT (OUTPATIENT)
Facility: LOCATION | Age: 50
End: 2025-06-26
Payer: COMMERCIAL

## 2025-06-26 VITALS
SYSTOLIC BLOOD PRESSURE: 113 MMHG | OXYGEN SATURATION: 98 % | HEART RATE: 82 BPM | TEMPERATURE: 99 F | DIASTOLIC BLOOD PRESSURE: 73 MMHG

## 2025-06-26 DIAGNOSIS — Z86.0100 HISTORY OF COLON POLYPS: Primary | ICD-10-CM

## 2025-06-26 PROCEDURE — 3078F DIAST BP <80 MM HG: CPT | Performed by: STUDENT IN AN ORGANIZED HEALTH CARE EDUCATION/TRAINING PROGRAM

## 2025-06-26 PROCEDURE — 99203 OFFICE O/P NEW LOW 30 MIN: CPT | Performed by: STUDENT IN AN ORGANIZED HEALTH CARE EDUCATION/TRAINING PROGRAM

## 2025-06-26 PROCEDURE — 3074F SYST BP LT 130 MM HG: CPT | Performed by: STUDENT IN AN ORGANIZED HEALTH CARE EDUCATION/TRAINING PROGRAM

## 2025-06-26 RX ORDER — POLYETHYLENE GLYCOL 3350, SODIUM CHLORIDE, SODIUM BICARBONATE, POTASSIUM CHLORIDE 420; 11.2; 5.72; 1.48 G/4L; G/4L; G/4L; G/4L
POWDER, FOR SOLUTION ORAL
Qty: 1 EACH | Refills: 0 | Status: CANCELLED | OUTPATIENT
Start: 2025-06-26

## 2025-06-26 RX ORDER — SODIUM, POTASSIUM,MAG SULFATES 17.5-3.13G
SOLUTION, RECONSTITUTED, ORAL ORAL
Qty: 1 KIT | Refills: 0 | Status: SHIPPED | OUTPATIENT
Start: 2025-06-26

## 2025-06-26 NOTE — H&P
New Patient Visit Note    The following individual(s) verbally consented to be recorded using ambient AI listening technology and understand that they can each withdraw their consent to this listening technology at any point by asking the clinician to turn off or pause the recording:    Patient name: Steven Hinton          Active Problems      1. History of colon polyps        Chief Complaint   Chief Complaint   Patient presents with    New Patient     NP- cscope consult. Pt had last cscope in 2021 which found some polyps. Pt's paternal grandmother had colon CA, pt's mother had polyps. Pt reports that on occasion prior to having BM's she will have very sharp pains across her lower abdomen. Pt denies any bleeding, cramping, dark stools, constipation, or diarrhea.        History of Present Illness   History of Present Illness  Steven Hinton is a 50 year old female with history of colonic polyps who presents for a follow-up colonoscopy.    She has a history of colonic polyps, with the last colonoscopy performed in [place], [place], at [facility] in 2021.  Patient believes she was found to have 4-5 polyps during that colonoscopy and that a follow-up colonoscopy was recommended in three years.  I cannot find a report of that colonoscopy or any associated pathology report in the electronic medical record.  Her family history is significant for her mother having polyps and her maternal grandmother having been diagnosed with colon cancer in her seventies or eighties, which prompted her initial colonoscopy.    She experiences intermittent sharp pain in the lower abdomen, occurring randomly a few minutes before a bowel movement. The pain is not localized to one side and resolves after the bowel movement. She describes the pain as 'very painful' but generally only last a few seconds.  Patient denies any change in bowel habits, anemia or unintentional weight loss.    She has a known liver hemangioma, diagnosed  approximately ten years ago, which requires periodic monitoring. She believes she has seen a liver specialist at [facility].  Last note I see from this issue was from 2021 at [place].  The hemangioma has not caused any discomfort. She is overdue for follow-up imaging, as the last recommendation was for an MRI six to twelve months after the last check in 2021.    She is currently taking Zepbound for weight loss, medication for GERD, and vitamins. She does not take blood thinners.  Patient states she has not had time to care for her own healthcare needs lately as she has been busy caring for her dad.         Allergies  Steven has no known allergies.    Past Medical / Surgical / Social / Family History    The past medical and past surgical history have been reviewed by me today.    Past Medical History[1]  Past Surgical History[2]    The family history and social history have been reviewed by me today.    Family History[3]  Social Hx on file[4]   Medications - Current[5]      Review of Systems  A 10 point review of systems was performed and negative unless otherwise documented per HPI.    Physical Findings   /73 (BP Location: Right arm, Patient Position: Sitting, Cuff Size: large)   Pulse 82   Temp 98.6 °F (37 °C) (Temporal)   LMP 09/25/2024 (Exact Date)   SpO2 98%   Physical Exam  Vitals and nursing note reviewed. Exam conducted with a chaperone present.   Constitutional:       General: She is not in acute distress.  HENT:      Head: Normocephalic and atraumatic.      Mouth/Throat:      Mouth: Mucous membranes are moist.   Cardiovascular:      Rate and Rhythm: Normal rate and regular rhythm.   Pulmonary:      Effort: Pulmonary effort is normal.   Abdominal:      General: There is no distension.      Palpations: Abdomen is soft.      Tenderness: There is no abdominal tenderness.   Musculoskeletal:         General: No deformity.   Skin:     General: Skin is warm and dry.   Neurological:      General: No  focal deficit present.      Mental Status: She is alert.   Psychiatric:         Mood and Affect: Mood normal.             Assessment and Plan   Assessment & Plan  Colonic polyps  History of colonic polyps with previous colonoscopy revealing 4-5 polyps. Family history of colonic polyps and colon cancer in maternal grandmother. Due for a follow-up colonoscopy as per previous recommendation of 3 years.  - I recommend scheduling a surveillance colonoscopy.  - The details of the colonoscopy procedure were discussed including the prep instructions, risks, benefits and alternatives.  - Patient expressed understanding and was agreeable to schedule a colonoscopy.  Colonoscopy has been scheduled for 2025 at [facility] under monitored anesthesia care.  - Patient was prescribed Suprep    Liver hemangioma  Diagnosed approximately 10 years ago. Last imaging in  recommended follow-up MRI in 6-12 months, which is overdue. No current symptoms or discomfort reported.  - I recommend follow-up with her hepatologist as soon as possible and obtain any surveillance imaging is recommended       Imaging & Referrals   None    Follow Up  No follow-ups on file.    Federico Murphy MD       [1]   Past Medical History:   GERD (gastroesophageal reflux disease)    Hiatal hernia    High cholesterol    Obesity   [2]   Past Surgical History:  Procedure Laterality Date         [3]   Family History  Problem Relation Age of Onset    Heart Disease Mother     Hypertension Mother     Other (tobacco use disorder) Mother     Polyps Mother     Colon Polyps Mother     Pancreatic Cancer Father     Colon Cancer Paternal Grandmother    [4]   Social History  Socioeconomic History    Marital status: Single   Tobacco Use    Smoking status: Former     Current packs/day: 0.00     Types: Cigarettes     Start date: 10/22/1989     Quit date: 10/22/1999     Years since quittin.6    Smokeless tobacco: Never   Vaping Use    Vaping status: Never  Used   Substance and Sexual Activity    Alcohol use: Yes     Alcohol/week: 3.0 standard drinks of alcohol     Types: 3 Cans of beer per week     Comment: socially    Drug use: Never    Sexual activity: Yes     Partners: Male   Other Topics Concern    Caffeine Concern No    Exercise No    Seat Belt No    Special Diet No    Stress Concern No    Weight Concern Yes     Comment: Taking weight loss injection   [5]   Current Outpatient Medications:     Tirzepatide-Weight Management (ZEPBOUND) 12.5 MG/0.5ML Subcutaneous Solution Auto-injector, Inject 12.5 mg into the skin once a week., Disp: 2 mL, Rfl: 0    Tirzepatide-Weight Management (ZEPBOUND) 15 MG/0.5ML Subcutaneous Solution Auto-injector, Inject 15 mg into the skin once a week. Begin after completing full 4 weeks on 12.5 mg weekly dose., Disp: 2 mL, Rfl: 2    PANTOPRAZOLE 40 MG Oral Tab EC, TAKE 1 TABLET(40 MG) BY MOUTH EVERY MORNING BEFORE BREAKFAST, Disp: 90 tablet, Rfl: 1    ergocalciferol 1.25 MG (07411 UT) Oral Cap, Take 1 capsule (50,000 Units total) by mouth once a week. With food, Disp: 12 capsule, Rfl: 1    LEVOTHYROXINE 25 MCG Oral Tab, TAKE 1 TABLET(25 MCG) BY MOUTH BEFORE BREAKFAST, Disp: 90 tablet, Rfl: 1    cyclobenzaprine 10 MG Oral Tab, Take 1 tablet (10 mg total) by mouth nightly as needed for Muscle spasms., Disp: 30 tablet, Rfl: 1    diclofenac 75 MG Oral Tab EC, Take 1 tablet (75 mg total) by mouth 2 (two) times daily., Disp: 60 tablet, Rfl: 1    albuterol 108 (90 Base) MCG/ACT Inhalation Aero Soln, Inhale 2 puffs into the lungs every 6 (six) hours as needed for Wheezing or Shortness of Breath., Disp: 1 each, Rfl: 2

## 2025-07-10 DIAGNOSIS — E03.9 HYPOTHYROIDISM, UNSPECIFIED TYPE: ICD-10-CM

## 2025-07-14 RX ORDER — LEVOTHYROXINE SODIUM 25 UG/1
25 TABLET ORAL
Qty: 90 TABLET | Refills: 1 | Status: SHIPPED | OUTPATIENT
Start: 2025-07-14

## 2025-07-14 NOTE — TELEPHONE ENCOUNTER
Lissette called to follow up on pended medication, advised has not been addressed yet, will send another message    Dr. Grimes - see pended refill request

## 2025-07-14 NOTE — TELEPHONE ENCOUNTER
Outpatient Medication Detail     Disp Refills Start End    LEVOTHYROXINE 25 MCG Oral Tab 90 tablet 1 7/14/2025 --    Sig - Route: TAKE 1 TABLET(25 MCG) BY MOUTH BEFORE BREAKFAST - Oral    Sent to pharmacy as: Levothyroxine Sodium 25 MCG Oral Tablet (Synthroid)    E-Prescribing Status: Receipt confirmed by pharmacy (7/14/2025 11:06 AM CDT)      Associated Diagnoses    Hypothyroidism, unspecified type        Pharmacy    [id number] [address] AT [facility] [address] [facility] [address], [phone], [phone]

## 2025-07-28 ENCOUNTER — HOSPITAL ENCOUNTER (OUTPATIENT)
Dept: MAMMOGRAPHY | Age: 50
Discharge: HOME | End: 2025-07-28
Attending: FAMILY MEDICINE
Payer: COMMERCIAL

## 2025-07-28 DIAGNOSIS — R73.03 PREDIABETES: ICD-10-CM

## 2025-07-28 DIAGNOSIS — Z51.81 ENCOUNTER FOR THERAPEUTIC DRUG MONITORING: ICD-10-CM

## 2025-07-28 DIAGNOSIS — E66.813 CLASS 3 SEVERE OBESITY WITH SERIOUS COMORBIDITY AND BODY MASS INDEX (BMI) OF 45.0 TO 49.9 IN ADULT, UNSPECIFIED OBESITY TYPE: ICD-10-CM

## 2025-07-28 DIAGNOSIS — Z12.31 SCREENING MAMMOGRAM FOR BREAST CANCER: ICD-10-CM

## 2025-07-28 DIAGNOSIS — G47.33 OSA (OBSTRUCTIVE SLEEP APNEA): ICD-10-CM

## 2025-07-28 PROCEDURE — 77067 SCR MAMMO BI INCL CAD: CPT | Performed by: FAMILY MEDICINE

## 2025-07-28 PROCEDURE — 77063 BREAST TOMOSYNTHESIS BI: CPT | Performed by: FAMILY MEDICINE

## 2025-07-28 RX ORDER — TIRZEPATIDE 12.5 MG/.5ML
12.5 INJECTION, SOLUTION SUBCUTANEOUS WEEKLY
Qty: 2 ML | Refills: 0 | OUTPATIENT
Start: 2025-07-28

## 2025-07-28 NOTE — TELEPHONE ENCOUNTER
Requesting zepbound 12.5  LOV: tele 6/24/25  LOV 5/15/25  RTC: 3 months    Future Appointments   Date Time Provider Department Center   9/17/2025  4:00 PM Laura Alofnso APRN [facility]     Patient not taking this dose

## 2025-07-29 DIAGNOSIS — Z51.81 ENCOUNTER FOR THERAPEUTIC DRUG MONITORING: ICD-10-CM

## 2025-07-29 DIAGNOSIS — R73.03 PREDIABETES: ICD-10-CM

## 2025-07-29 DIAGNOSIS — G47.33 OSA (OBSTRUCTIVE SLEEP APNEA): ICD-10-CM

## 2025-07-29 DIAGNOSIS — E66.813 CLASS 3 SEVERE OBESITY WITH SERIOUS COMORBIDITY AND BODY MASS INDEX (BMI) OF 45.0 TO 49.9 IN ADULT, UNSPECIFIED OBESITY TYPE: ICD-10-CM

## 2025-07-29 RX ORDER — TIRZEPATIDE 12.5 MG/.5ML
12.5 INJECTION, SOLUTION SUBCUTANEOUS WEEKLY
Qty: 2 ML | Refills: 0 | OUTPATIENT
Start: 2025-07-29

## 2025-08-04 ENCOUNTER — NURSE TRIAGE (OUTPATIENT)
Dept: FAMILY MEDICINE CLINIC | Facility: CLINIC | Age: 50
End: 2025-08-04

## 2025-08-05 ENCOUNTER — OFFICE VISIT (OUTPATIENT)
Dept: FAMILY MEDICINE CLINIC | Facility: CLINIC | Age: 50
End: 2025-08-05

## 2025-08-05 VITALS
SYSTOLIC BLOOD PRESSURE: 126 MMHG | HEART RATE: 88 BPM | HEIGHT: 64 IN | RESPIRATION RATE: 16 BRPM | BODY MASS INDEX: 42.85 KG/M2 | DIASTOLIC BLOOD PRESSURE: 86 MMHG | OXYGEN SATURATION: 97 % | WEIGHT: 251 LBS

## 2025-08-05 DIAGNOSIS — S80.11XA TRAUMATIC ECCHYMOSIS OF MULTIPLE SITES OF RIGHT LOWER EXTREMITY, INITIAL ENCOUNTER: ICD-10-CM

## 2025-08-05 DIAGNOSIS — M79.89 CALF SWELLING: Primary | ICD-10-CM

## 2025-08-05 PROCEDURE — 3079F DIAST BP 80-89 MM HG: CPT

## 2025-08-05 PROCEDURE — 3008F BODY MASS INDEX DOCD: CPT

## 2025-08-05 PROCEDURE — 99214 OFFICE O/P EST MOD 30 MIN: CPT

## 2025-08-05 PROCEDURE — 3074F SYST BP LT 130 MM HG: CPT

## 2025-08-07 ENCOUNTER — APPOINTMENT (OUTPATIENT)
Dept: ULTRASOUND IMAGING | Facility: HOSPITAL | Age: 50
End: 2025-08-07
Attending: EMERGENCY MEDICINE

## 2025-08-07 ENCOUNTER — HOSPITAL ENCOUNTER (EMERGENCY)
Facility: HOSPITAL | Age: 50
Discharge: HOME | End: 2025-08-07
Attending: EMERGENCY MEDICINE

## 2025-08-07 ENCOUNTER — HOSPITAL ENCOUNTER (OUTPATIENT)
Dept: ULTRASOUND IMAGING | Facility: HOSPITAL | Age: 50
Discharge: HOME | End: 2025-08-07

## 2025-08-07 ENCOUNTER — APPOINTMENT (OUTPATIENT)
Dept: GENERAL RADIOLOGY | Facility: HOSPITAL | Age: 50
End: 2025-08-07
Attending: EMERGENCY MEDICINE

## 2025-08-07 ENCOUNTER — TELEPHONE (OUTPATIENT)
Dept: FAMILY MEDICINE CLINIC | Facility: CLINIC | Age: 50
End: 2025-08-07

## 2025-08-07 VITALS
OXYGEN SATURATION: 100 % | SYSTOLIC BLOOD PRESSURE: 120 MMHG | TEMPERATURE: 97 F | HEIGHT: 65 IN | BODY MASS INDEX: 39.99 KG/M2 | RESPIRATION RATE: 16 BRPM | WEIGHT: 240 LBS | DIASTOLIC BLOOD PRESSURE: 76 MMHG | HEART RATE: 63 BPM

## 2025-08-07 DIAGNOSIS — S86.811A STRAIN OF RIGHT CALF MUSCLE: ICD-10-CM

## 2025-08-07 DIAGNOSIS — S80.11XA TRAUMATIC ECCHYMOSIS OF MULTIPLE SITES OF RIGHT LOWER EXTREMITY, INITIAL ENCOUNTER: ICD-10-CM

## 2025-08-07 DIAGNOSIS — M79.89 CALF SWELLING: ICD-10-CM

## 2025-08-07 DIAGNOSIS — I82.4Y1 ACUTE DEEP VEIN THROMBOSIS (DVT) OF PROXIMAL VEIN OF RIGHT LOWER EXTREMITY (HCC): Primary | ICD-10-CM

## 2025-08-07 LAB
ALBUMIN SERPL-MCNC: 4.5 G/DL (ref 3.2–4.8)
ALBUMIN/GLOB SERPL: 2 (ref 1–2)
ALP LIVER SERPL-CCNC: 60 U/L (ref 41–108)
ALT SERPL-CCNC: 28 U/L (ref 10–49)
ANION GAP SERPL CALC-SCNC: 11 MMOL/L (ref 0–18)
AST SERPL-CCNC: 23 U/L (ref ?–34)
BASOPHILS # BLD AUTO: 0.03 X10(3) UL (ref 0–0.2)
BASOPHILS NFR BLD AUTO: 0.4 %
BILIRUB SERPL-MCNC: 0.4 MG/DL (ref 0.3–1.2)
BUN BLD-MCNC: 7 MG/DL (ref 9–23)
CALCIUM BLD-MCNC: 9.5 MG/DL (ref 8.7–10.6)
CHLORIDE SERPL-SCNC: 104 MMOL/L (ref 98–112)
CO2 SERPL-SCNC: 25 MMOL/L (ref 21–32)
CREAT BLD-MCNC: 0.9 MG/DL (ref 0.55–1.02)
EGFRCR SERPLBLD CKD-EPI 2021: 77 ML/MIN/1.73M2 (ref 60–?)
EOSINOPHIL # BLD AUTO: 0.2 X10(3) UL (ref 0–0.7)
EOSINOPHIL NFR BLD AUTO: 2.6 %
ERYTHROCYTE [DISTWIDTH] IN BLOOD BY AUTOMATED COUNT: 13.5 %
GLOBULIN PLAS-MCNC: 2.3 G/DL (ref 2–3.5)
GLUCOSE BLD-MCNC: 93 MG/DL (ref 70–99)
HCT VFR BLD AUTO: 44 % (ref 35–48)
HGB BLD-MCNC: 14.4 G/DL (ref 12–16)
IMM GRANULOCYTES # BLD AUTO: 0.05 X10(3) UL (ref 0–1)
IMM GRANULOCYTES NFR BLD: 0.6 %
LYMPHOCYTES # BLD AUTO: 2.02 X10(3) UL (ref 1–4)
LYMPHOCYTES NFR BLD AUTO: 26.2 %
MCH RBC QN AUTO: 30.6 PG (ref 26–34)
MCHC RBC AUTO-ENTMCNC: 32.7 G/DL (ref 31–37)
MCV RBC AUTO: 93.4 FL (ref 80–100)
MONOCYTES # BLD AUTO: 0.4 X10(3) UL (ref 0.1–1)
MONOCYTES NFR BLD AUTO: 5.2 %
NEUTROPHILS # BLD AUTO: 5.02 X10 (3) UL (ref 1.5–7.7)
NEUTROPHILS # BLD AUTO: 5.02 X10(3) UL (ref 1.5–7.7)
NEUTROPHILS NFR BLD AUTO: 65 %
OSMOLALITY SERPL CALC.SUM OF ELEC: 288 MOSM/KG (ref 275–295)
PLATELET # BLD AUTO: 305 10(3)UL (ref 150–450)
POTASSIUM SERPL-SCNC: 3.9 MMOL/L (ref 3.5–5.1)
PROT SERPL-MCNC: 6.8 G/DL (ref 5.7–8.2)
RBC # BLD AUTO: 4.71 X10(6)UL (ref 3.8–5.3)
SODIUM SERPL-SCNC: 140 MMOL/L (ref 136–145)
WBC # BLD AUTO: 7.7 X10(3) UL (ref 4–11)

## 2025-08-07 PROCEDURE — 76881 US COMPL JOINT R-T W/IMG: CPT

## 2025-08-07 PROCEDURE — 73590 X-RAY EXAM OF LOWER LEG: CPT | Performed by: EMERGENCY MEDICINE

## 2025-08-07 PROCEDURE — 85025 COMPLETE CBC W/AUTO DIFF WBC: CPT | Performed by: EMERGENCY MEDICINE

## 2025-08-07 PROCEDURE — 99285 EMERGENCY DEPT VISIT HI MDM: CPT

## 2025-08-07 PROCEDURE — 80053 COMPREHEN METABOLIC PANEL: CPT | Performed by: EMERGENCY MEDICINE

## 2025-08-07 PROCEDURE — 36415 COLL VENOUS BLD VENIPUNCTURE: CPT

## 2025-08-07 PROCEDURE — 99284 EMERGENCY DEPT VISIT MOD MDM: CPT

## 2025-08-07 PROCEDURE — 93971 EXTREMITY STUDY: CPT | Performed by: EMERGENCY MEDICINE

## 2025-08-07 RX ORDER — KETOROLAC TROMETHAMINE 30 MG/ML
30 INJECTION, SOLUTION INTRAMUSCULAR; INTRAVENOUS ONCE
Status: DISCONTINUED | OUTPATIENT
Start: 2025-08-07 | End: 2025-08-07

## 2025-08-08 ENCOUNTER — TELEPHONE (OUTPATIENT)
Dept: FAMILY MEDICINE CLINIC | Facility: CLINIC | Age: 50
End: 2025-08-08

## 2025-08-08 ENCOUNTER — TELEPHONE (OUTPATIENT)
Facility: CLINIC | Age: 50
End: 2025-08-08

## 2025-08-08 DIAGNOSIS — I82.4Y1 ACUTE DEEP VEIN THROMBOSIS (DVT) OF PROXIMAL VEIN OF RIGHT LOWER EXTREMITY (HCC): Primary | ICD-10-CM

## 2025-08-11 ENCOUNTER — OFFICE VISIT (OUTPATIENT)
Dept: ORTHOPEDICS CLINIC | Facility: CLINIC | Age: 50
End: 2025-08-11

## 2025-08-11 DIAGNOSIS — I82.401 ACUTE DEEP VEIN THROMBOSIS (DVT) OF RIGHT LOWER EXTREMITY, UNSPECIFIED VEIN (HCC): ICD-10-CM

## 2025-08-11 DIAGNOSIS — S86.111A GASTROCNEMIUS STRAIN, RIGHT, INITIAL ENCOUNTER: Primary | ICD-10-CM

## 2025-08-21 ENCOUNTER — HOSPITAL ENCOUNTER (OUTPATIENT)
Dept: MAMMOGRAPHY | Age: 50
Discharge: HOME | End: 2025-08-21
Attending: FAMILY MEDICINE

## 2025-08-21 DIAGNOSIS — R92.2 INCONCLUSIVE MAMMOGRAM: ICD-10-CM

## 2025-08-21 PROCEDURE — 77065 DX MAMMO INCL CAD UNI: CPT | Performed by: FAMILY MEDICINE

## 2025-08-21 PROCEDURE — 77061 BREAST TOMOSYNTHESIS UNI: CPT | Performed by: FAMILY MEDICINE

## 2025-08-26 ENCOUNTER — OFFICE VISIT (OUTPATIENT)
Facility: LOCATION | Age: 50
End: 2025-08-26
Attending: INTERNAL MEDICINE

## 2025-08-26 VITALS
WEIGHT: 242.19 LBS | DIASTOLIC BLOOD PRESSURE: 88 MMHG | HEART RATE: 84 BPM | BODY MASS INDEX: 40 KG/M2 | SYSTOLIC BLOOD PRESSURE: 131 MMHG | OXYGEN SATURATION: 98 % | RESPIRATION RATE: 20 BRPM | TEMPERATURE: 97 F

## 2025-08-26 DIAGNOSIS — I82.4Z1 ACUTE DEEP VEIN THROMBOSIS OF LOWER LEG, RIGHT (HCC): Primary | ICD-10-CM

## 2025-08-28 ENCOUNTER — OFFICE VISIT (OUTPATIENT)
Dept: ORTHOPEDICS CLINIC | Facility: CLINIC | Age: 50
End: 2025-08-28

## 2025-08-28 DIAGNOSIS — S86.111D GASTROCNEMIUS STRAIN, RIGHT, SUBSEQUENT ENCOUNTER: Primary | ICD-10-CM

## 2025-08-28 DIAGNOSIS — I82.401 ACUTE DEEP VEIN THROMBOSIS (DVT) OF RIGHT LOWER EXTREMITY, UNSPECIFIED VEIN (HCC): ICD-10-CM

## 2025-08-28 PROCEDURE — 99213 OFFICE O/P EST LOW 20 MIN: CPT | Performed by: FAMILY MEDICINE

## (undated) NOTE — Clinical Note
Thank you for referring Steven to the [facility]. Consult was completed today via person.  I have ordered labs, referred for a nutrition consultation with our dietician, and counseling.  I counseled on the importance of lifestyle intervention in adjunct with medication and started Zepbound for treatment with follow-up advised in about 4 months. BP mildly elevated at consult. Advised home monitoring and f/u with you.